# Patient Record
Sex: FEMALE | Race: WHITE | HISPANIC OR LATINO | URBAN - METROPOLITAN AREA
[De-identification: names, ages, dates, MRNs, and addresses within clinical notes are randomized per-mention and may not be internally consistent; named-entity substitution may affect disease eponyms.]

---

## 2017-01-06 RX ORDER — TRAZODONE HCL 50 MG
0 TABLET ORAL
Qty: 30 | Refills: 0 | COMMUNITY
Start: 2017-01-06

## 2017-01-06 RX ORDER — TRAZODONE HCL 50 MG
1 TABLET ORAL
Qty: 30 | Refills: 0 | COMMUNITY
Start: 2017-01-06

## 2017-01-31 RX ORDER — LOSARTAN POTASSIUM 100 MG/1
0 TABLET, FILM COATED ORAL
Qty: 60 | Refills: 0 | COMMUNITY
Start: 2017-01-31

## 2017-01-31 RX ORDER — LOSARTAN POTASSIUM 100 MG/1
1 TABLET, FILM COATED ORAL
Qty: 60 | Refills: 0 | COMMUNITY
Start: 2017-01-31

## 2017-02-01 RX ORDER — INSULIN GLARGINE 100 [IU]/ML
0 INJECTION, SOLUTION SUBCUTANEOUS
Qty: 10 | Refills: 0 | COMMUNITY
Start: 2017-02-01

## 2017-02-02 RX ORDER — TEMAZEPAM 15 MG/1
0 CAPSULE ORAL
Qty: 30 | Refills: 0 | COMMUNITY
Start: 2017-02-02

## 2017-02-02 RX ORDER — TEMAZEPAM 15 MG/1
1 CAPSULE ORAL
Qty: 30 | Refills: 0 | COMMUNITY
Start: 2017-02-02

## 2017-02-20 RX ORDER — METOCLOPRAMIDE HCL 10 MG
1 TABLET ORAL
Qty: 90 | Refills: 0 | COMMUNITY
Start: 2017-02-20

## 2017-02-20 RX ORDER — CALCIUM ACETATE 667 MG
0 TABLET ORAL
Qty: 180 | Refills: 0 | COMMUNITY
Start: 2017-02-20

## 2017-02-20 RX ORDER — METOCLOPRAMIDE HCL 10 MG
0 TABLET ORAL
Qty: 90 | Refills: 0 | COMMUNITY
Start: 2017-02-20

## 2017-02-20 RX ORDER — CALCIUM ACETATE 667 MG
2 TABLET ORAL
Qty: 180 | Refills: 0 | COMMUNITY
Start: 2017-02-20

## 2017-02-20 RX ORDER — FUROSEMIDE 40 MG
0 TABLET ORAL
Qty: 30 | Refills: 0 | COMMUNITY
Start: 2017-02-20

## 2017-03-06 RX ORDER — CLOPIDOGREL BISULFATE 75 MG/1
1 TABLET, FILM COATED ORAL
Qty: 30 | Refills: 0 | COMMUNITY
Start: 2017-03-06

## 2017-03-06 RX ORDER — CLOPIDOGREL BISULFATE 75 MG/1
0 TABLET, FILM COATED ORAL
Qty: 30 | Refills: 0 | COMMUNITY
Start: 2017-03-06

## 2017-03-15 RX ORDER — SIMVASTATIN 20 MG/1
0 TABLET, FILM COATED ORAL
Qty: 30 | Refills: 0 | COMMUNITY
Start: 2017-03-15

## 2017-03-15 RX ORDER — CARVEDILOL PHOSPHATE 80 MG/1
0 CAPSULE, EXTENDED RELEASE ORAL
Qty: 60 | Refills: 0 | COMMUNITY
Start: 2017-03-15

## 2017-03-15 RX ORDER — SIMVASTATIN 20 MG/1
1 TABLET, FILM COATED ORAL
Qty: 30 | Refills: 0 | COMMUNITY
Start: 2017-03-15

## 2017-03-15 RX ORDER — CARVEDILOL PHOSPHATE 80 MG/1
1 CAPSULE, EXTENDED RELEASE ORAL
Qty: 60 | Refills: 0 | COMMUNITY
Start: 2017-03-15

## 2017-03-16 ENCOUNTER — INPATIENT (INPATIENT)
Facility: HOSPITAL | Age: 72
LOS: 7 days | Discharge: HOME | DRG: 291 | End: 2017-03-24
Attending: INTERNAL MEDICINE | Admitting: INTERNAL MEDICINE
Payer: MEDICARE

## 2017-03-16 VITALS
TEMPERATURE: 99 F | DIASTOLIC BLOOD PRESSURE: 68 MMHG | SYSTOLIC BLOOD PRESSURE: 137 MMHG | HEART RATE: 87 BPM | OXYGEN SATURATION: 95 % | RESPIRATION RATE: 18 BRPM

## 2017-03-16 DIAGNOSIS — J81.0 ACUTE PULMONARY EDEMA: ICD-10-CM

## 2017-03-16 LAB
ALBUMIN SERPL ELPH-MCNC: 3.8 G/DL — SIGNIFICANT CHANGE UP (ref 3.3–5)
ALP SERPL-CCNC: 86 U/L — SIGNIFICANT CHANGE UP (ref 40–120)
ALT FLD-CCNC: 19 U/L RC — SIGNIFICANT CHANGE UP (ref 10–45)
ANION GAP SERPL CALC-SCNC: 16 MMOL/L — SIGNIFICANT CHANGE UP (ref 5–17)
APTT BLD: 32.7 SEC — SIGNIFICANT CHANGE UP (ref 27.5–37.4)
AST SERPL-CCNC: 28 U/L — SIGNIFICANT CHANGE UP (ref 10–40)
BASE EXCESS BLDV CALC-SCNC: 3.1 MMOL/L — HIGH (ref -2–2)
BASOPHILS # BLD AUTO: 0 K/UL — SIGNIFICANT CHANGE UP (ref 0–0.2)
BASOPHILS NFR BLD AUTO: 0 % — SIGNIFICANT CHANGE UP (ref 0–2)
BILIRUB SERPL-MCNC: 0.4 MG/DL — SIGNIFICANT CHANGE UP (ref 0.2–1.2)
BUN SERPL-MCNC: 27 MG/DL — HIGH (ref 7–23)
CA-I SERPL-SCNC: 1.15 MMOL/L — SIGNIFICANT CHANGE UP (ref 1.12–1.3)
CALCIUM SERPL-MCNC: 8.9 MG/DL — SIGNIFICANT CHANGE UP (ref 8.4–10.5)
CHLORIDE BLDV-SCNC: 103 MMOL/L — SIGNIFICANT CHANGE UP (ref 96–108)
CHLORIDE SERPL-SCNC: 100 MMOL/L — SIGNIFICANT CHANGE UP (ref 96–108)
CO2 BLDV-SCNC: 30 MMOL/L — SIGNIFICANT CHANGE UP (ref 22–30)
CO2 SERPL-SCNC: 24 MMOL/L — SIGNIFICANT CHANGE UP (ref 22–31)
CREAT SERPL-MCNC: 4.05 MG/DL — HIGH (ref 0.5–1.3)
EOSINOPHIL # BLD AUTO: 0 K/UL — SIGNIFICANT CHANGE UP (ref 0–0.5)
EOSINOPHIL NFR BLD AUTO: 0.1 % — SIGNIFICANT CHANGE UP (ref 0–6)
GAS PNL BLDV: 136 MMOL/L — SIGNIFICANT CHANGE UP (ref 136–145)
GAS PNL BLDV: SIGNIFICANT CHANGE UP
GLUCOSE BLDV-MCNC: 306 MG/DL — HIGH (ref 70–99)
GLUCOSE SERPL-MCNC: 344 MG/DL — HIGH (ref 70–99)
HCO3 BLDV-SCNC: 29 MMOL/L — SIGNIFICANT CHANGE UP (ref 21–29)
HCOV OC43 RNA SPEC QL NAA+PROBE: DETECTED
HCT VFR BLD CALC: 34.2 % — LOW (ref 39–50)
HCT VFR BLDA CALC: 32 % — LOW (ref 39–50)
HGB BLD CALC-MCNC: 10.4 G/DL — LOW (ref 13–17)
HGB BLD-MCNC: 10.8 G/DL — LOW (ref 13–17)
INR BLD: 1.15 RATIO — SIGNIFICANT CHANGE UP (ref 0.88–1.16)
LACTATE BLDV-MCNC: 1.3 MMOL/L — SIGNIFICANT CHANGE UP (ref 0.7–2)
LYMPHOCYTES # BLD AUTO: 0.4 K/UL — LOW (ref 1–3.3)
LYMPHOCYTES # BLD AUTO: 7.6 % — LOW (ref 13–44)
MCHC RBC-ENTMCNC: 31.7 GM/DL — LOW (ref 32–36)
MCHC RBC-ENTMCNC: 32.6 PG — SIGNIFICANT CHANGE UP (ref 27–34)
MCV RBC AUTO: 103 FL — HIGH (ref 80–100)
MONOCYTES # BLD AUTO: 0.6 K/UL — SIGNIFICANT CHANGE UP (ref 0–0.9)
MONOCYTES NFR BLD AUTO: 9.9 % — SIGNIFICANT CHANGE UP (ref 2–14)
NEUTROPHILS # BLD AUTO: 4.8 K/UL — SIGNIFICANT CHANGE UP (ref 1.8–7.4)
NEUTROPHILS NFR BLD AUTO: 82.4 % — HIGH (ref 43–77)
OTHER CELLS CSF MANUAL: 11 ML/DL — LOW (ref 18–22)
PCO2 BLDV: 53 MMHG — HIGH (ref 35–50)
PH BLDV: 7.36 — SIGNIFICANT CHANGE UP (ref 7.35–7.45)
PLATELET # BLD AUTO: 147 K/UL — LOW (ref 150–400)
PO2 BLDV: 46 MMHG — HIGH (ref 25–45)
POTASSIUM BLDV-SCNC: 4.2 MMOL/L — SIGNIFICANT CHANGE UP (ref 3.5–5)
POTASSIUM SERPL-MCNC: 4.5 MMOL/L — SIGNIFICANT CHANGE UP (ref 3.5–5.3)
POTASSIUM SERPL-SCNC: 4.5 MMOL/L — SIGNIFICANT CHANGE UP (ref 3.5–5.3)
PROT SERPL-MCNC: 7.2 G/DL — SIGNIFICANT CHANGE UP (ref 6–8.3)
PROTHROM AB SERPL-ACNC: 12.6 SEC — SIGNIFICANT CHANGE UP (ref 10–13.1)
RAPID RVP RESULT: DETECTED
RBC # BLD: 3.32 M/UL — LOW (ref 4.2–5.8)
RBC # FLD: 19.3 % — HIGH (ref 10.3–14.5)
SAO2 % BLDV: 75 % — SIGNIFICANT CHANGE UP (ref 67–88)
SODIUM SERPL-SCNC: 140 MMOL/L — SIGNIFICANT CHANGE UP (ref 135–145)
TROPONIN T SERPL-MCNC: 0.12 NG/ML — HIGH (ref 0–0.06)
WBC # BLD: 5.8 K/UL — SIGNIFICANT CHANGE UP (ref 3.8–10.5)
WBC # FLD AUTO: 5.8 K/UL — SIGNIFICANT CHANGE UP (ref 3.8–10.5)

## 2017-03-16 PROCEDURE — 99285 EMERGENCY DEPT VISIT HI MDM: CPT | Mod: 25

## 2017-03-16 PROCEDURE — 71010: CPT | Mod: 26

## 2017-03-16 PROCEDURE — 93010 ELECTROCARDIOGRAM REPORT: CPT

## 2017-03-16 RX ORDER — SODIUM CHLORIDE 9 MG/ML
3 INJECTION INTRAMUSCULAR; INTRAVENOUS; SUBCUTANEOUS ONCE
Qty: 0 | Refills: 0 | Status: COMPLETED | OUTPATIENT
Start: 2017-03-16 | End: 2017-03-16

## 2017-03-16 RX ORDER — ALBUTEROL 90 UG/1
2.5 AEROSOL, METERED ORAL ONCE
Qty: 0 | Refills: 0 | Status: COMPLETED | OUTPATIENT
Start: 2017-03-16 | End: 2017-03-16

## 2017-03-16 RX ORDER — ALBUTEROL 90 UG/1
2.5 AEROSOL, METERED ORAL ONCE
Qty: 0 | Refills: 0 | Status: DISCONTINUED | OUTPATIENT
Start: 2017-03-16 | End: 2017-03-16

## 2017-03-16 RX ADMIN — ALBUTEROL 2.5 MILLIGRAM(S): 90 AEROSOL, METERED ORAL at 20:00

## 2017-03-16 RX ADMIN — SODIUM CHLORIDE 3 MILLILITER(S): 9 INJECTION INTRAMUSCULAR; INTRAVENOUS; SUBCUTANEOUS at 20:08

## 2017-03-16 NOTE — ED PROVIDER NOTE - NS ED MD SCRIBE ATTENDING SCRIBE SECTIONS
PAST MEDICAL/SURGICAL/SOCIAL HISTORY/HISTORY OF PRESENT ILLNESS/INTAKE ASSESSMENT/SCREENINGS/HIV/VITAL SIGNS( Pullset)/REVIEW OF SYSTEMS/PHYSICAL EXAM

## 2017-03-16 NOTE — ED ADULT NURSE NOTE - OBJECTIVE STATEMENT
71 year old female patient presents to ED c/o cough and difficulty breathing x 3 weeks. Daughter at bedside reports worsening SOB, and difficulty laying flat - she has only been able to sleep in a chair. Pt receives dialysis MWF with fistula present to RUE. Pt appears SOB and placed on 3L NC. Pt denies chest pain, n/v/d, fever, chills.

## 2017-03-16 NOTE — ED PROVIDER NOTE - PROGRESS NOTE DETAILS
Symptoms improved but not resolved after albuterol treatment. Potassium noted to be 4.5 Symptoms improved after albuterol treatment. Potassium noted to be 4.5 --BMM Nephrology fellow consulted; will see pt in ED.  --BMM

## 2017-03-16 NOTE — ED PROVIDER NOTE - MUSCULOSKELETAL, MLM
Spine appears normal, range of motion is not limited, no muscle or joint tenderness, no pitting edema distally

## 2017-03-16 NOTE — ED PROVIDER NOTE - CARE PLAN
Principal Discharge DX:	Acute pulmonary edema  Secondary Diagnosis:	Hypoxia  Secondary Diagnosis:	Pneumonia due to infectious organism, unspecified laterality, unspecified part of lung

## 2017-03-16 NOTE — ED PROVIDER NOTE - MEDICAL DECISION MAKING DETAILS
71 year old chronically ill female, hx of ESRD dialyzed yesterday while in patient for treatment for hyperkalemia at [place], presents with worsening difficulty breathing, SOB, orthopnea. Has progressively worsening MURPHY. Pulmonary exam concerning for pulmonary edema vs pleural effusion. Less likely PNA though will check RVP. Will check labs, CXR, EKG, and give Albuterol, supplemental oxygen, low threshold to start BIPAP, pt likely needs dialysis and admission.

## 2017-03-16 NOTE — ED PROVIDER NOTE - OBJECTIVE STATEMENT
71 year old female with PMHx of HTN, anxiety, depression, DM, CKD s/p infection from colonoscopy, on dialysis at [place], presents with cough productive of white sputum and dyspnea, and worsening orthopnea x several weeks. States she sleeps sitting upright. Denies fevers, chills, N/V, CP, or pedal edema. Endorses recent sensation of pressure in upper abdomen. Pt with multiple hospitalizations at [facility] for hyperkalemia- last admitted 1 week ago and had normal dialysis there yesterday. Pt's family reports that she had the cough while admitted but was not treated for it, only had CXR of which they do not know the results. No recent antibiotics. Pt has not seen her PMD recently due to multiple hospitalizations.   PMD: Dr. Og ([place])   Medications: Lasix, Reglan, Diazepam, Losartan, Lopressor, Simvastatin, Calcium, Carvedilol, Lantis, Trazodone. Reports compliance with her medications. 71 year old female with PMHx of HTN, anxiety, depression, DM, ESRD s/p infection from colonoscopy, on dialysis at [place], presents with cough productive of white sputum and dyspnea, and worsening orthopnea x several weeks. States she sleeps sitting upright. Denies fevers, chills, N/V, CP, or pedal edema. Endorses recent sensation of pressure in upper abdomen. Pt with multiple hospitalizations at [facility] for hyperkalemia- last admitted 1 week ago and had normal dialysis there yesterday. Pt's family reports that she had the cough while admitted but was not treated for it, only had CXR of which they do not know the results. No recent antibiotics. Pt has not seen her PMD recently due to multiple hospitalizations.   PMD: Dr. Og ([place])   Medications: Lasix, Reglan, Diazepam, Losartan, Lopressor, Simvastatin, Calcium, Carvedilol, Lantis, Trazodone. Reports compliance with her medications.

## 2017-03-17 DIAGNOSIS — R06.00 DYSPNEA, UNSPECIFIED: ICD-10-CM

## 2017-03-17 DIAGNOSIS — E11.21 TYPE 2 DIABETES MELLITUS WITH DIABETIC NEPHROPATHY: ICD-10-CM

## 2017-03-17 DIAGNOSIS — J81.0 ACUTE PULMONARY EDEMA: ICD-10-CM

## 2017-03-17 DIAGNOSIS — I10 ESSENTIAL (PRIMARY) HYPERTENSION: ICD-10-CM

## 2017-03-17 DIAGNOSIS — N18.6 END STAGE RENAL DISEASE: ICD-10-CM

## 2017-03-17 DIAGNOSIS — F41.9 ANXIETY DISORDER, UNSPECIFIED: ICD-10-CM

## 2017-03-17 LAB
ANION GAP SERPL CALC-SCNC: 18 MMOL/L — HIGH (ref 5–17)
BASOPHILS # BLD AUTO: 0 K/UL — SIGNIFICANT CHANGE UP (ref 0–0.2)
BASOPHILS NFR BLD AUTO: 0 % — SIGNIFICANT CHANGE UP (ref 0–2)
BUN SERPL-MCNC: 31 MG/DL — HIGH (ref 7–23)
CALCIUM SERPL-MCNC: 8.9 MG/DL — SIGNIFICANT CHANGE UP (ref 8.4–10.5)
CHLORIDE SERPL-SCNC: 96 MMOL/L — SIGNIFICANT CHANGE UP (ref 96–108)
CK MB CFR SERPL CALC: 3.1 NG/ML — SIGNIFICANT CHANGE UP (ref 0–3.8)
CK SERPL-CCNC: 48 U/L — SIGNIFICANT CHANGE UP (ref 25–170)
CO2 SERPL-SCNC: 25 MMOL/L — SIGNIFICANT CHANGE UP (ref 22–31)
CREAT SERPL-MCNC: 4.38 MG/DL — HIGH (ref 0.5–1.3)
EOSINOPHIL # BLD AUTO: 0 K/UL — SIGNIFICANT CHANGE UP (ref 0–0.5)
EOSINOPHIL NFR BLD AUTO: 0 % — SIGNIFICANT CHANGE UP (ref 0–6)
GAS PNL BLDA: SIGNIFICANT CHANGE UP
GLUCOSE SERPL-MCNC: 284 MG/DL — HIGH (ref 70–99)
HCT VFR BLD CALC: 35.2 % — SIGNIFICANT CHANGE UP (ref 34.5–45)
HGB BLD-MCNC: 11.2 G/DL — LOW (ref 11.5–15.5)
LYMPHOCYTES # BLD AUTO: 0.3 K/UL — LOW (ref 1–3.3)
LYMPHOCYTES # BLD AUTO: 2.3 % — LOW (ref 13–44)
MAGNESIUM SERPL-MCNC: 2 MG/DL — SIGNIFICANT CHANGE UP (ref 1.6–2.6)
MCHC RBC-ENTMCNC: 31.9 GM/DL — LOW (ref 32–36)
MCHC RBC-ENTMCNC: 33 PG — SIGNIFICANT CHANGE UP (ref 27–34)
MCV RBC AUTO: 103 FL — HIGH (ref 80–100)
MONOCYTES # BLD AUTO: 0.5 K/UL — SIGNIFICANT CHANGE UP (ref 0–0.9)
MONOCYTES NFR BLD AUTO: 3.8 % — SIGNIFICANT CHANGE UP (ref 2–14)
NEUTROPHILS # BLD AUTO: 12.7 K/UL — HIGH (ref 1.8–7.4)
NEUTROPHILS NFR BLD AUTO: 93.9 % — HIGH (ref 43–77)
PHOSPHATE SERPL-MCNC: 3.6 MG/DL — SIGNIFICANT CHANGE UP (ref 2.5–4.5)
PLATELET # BLD AUTO: 141 K/UL — LOW (ref 150–400)
POTASSIUM SERPL-MCNC: 4.5 MMOL/L — SIGNIFICANT CHANGE UP (ref 3.5–5.3)
POTASSIUM SERPL-SCNC: 4.5 MMOL/L — SIGNIFICANT CHANGE UP (ref 3.5–5.3)
RBC # BLD: 3.41 M/UL — LOW (ref 3.8–5.2)
RBC # FLD: 19.1 % — HIGH (ref 10.3–14.5)
SODIUM SERPL-SCNC: 139 MMOL/L — SIGNIFICANT CHANGE UP (ref 135–145)
TROPONIN T SERPL-MCNC: 0.11 NG/ML — HIGH (ref 0–0.06)
WBC # BLD: 13.5 K/UL — HIGH (ref 3.8–10.5)
WBC # FLD AUTO: 13.5 K/UL — HIGH (ref 3.8–10.5)

## 2017-03-17 PROCEDURE — 99223 1ST HOSP IP/OBS HIGH 75: CPT

## 2017-03-17 PROCEDURE — 93306 TTE W/DOPPLER COMPLETE: CPT | Mod: 26

## 2017-03-17 PROCEDURE — 71250 CT THORAX DX C-: CPT | Mod: 26

## 2017-03-17 PROCEDURE — 70450 CT HEAD/BRAIN W/O DYE: CPT | Mod: 26

## 2017-03-17 PROCEDURE — 99222 1ST HOSP IP/OBS MODERATE 55: CPT

## 2017-03-17 RX ORDER — CALCIUM ACETATE 667 MG
1334 TABLET ORAL
Qty: 0 | Refills: 0 | Status: DISCONTINUED | OUTPATIENT
Start: 2017-03-17 | End: 2017-03-24

## 2017-03-17 RX ORDER — CARVEDILOL PHOSPHATE 80 MG/1
12.5 CAPSULE, EXTENDED RELEASE ORAL EVERY 12 HOURS
Qty: 0 | Refills: 0 | Status: DISCONTINUED | OUTPATIENT
Start: 2017-03-17 | End: 2017-03-24

## 2017-03-17 RX ORDER — MORPHINE SULFATE 50 MG/1
4 CAPSULE, EXTENDED RELEASE ORAL ONCE
Qty: 0 | Refills: 0 | Status: DISCONTINUED | OUTPATIENT
Start: 2017-03-17 | End: 2017-03-17

## 2017-03-17 RX ORDER — TEMAZEPAM 15 MG/1
15 CAPSULE ORAL ONCE
Qty: 0 | Refills: 0 | Status: DISCONTINUED | OUTPATIENT
Start: 2017-03-17 | End: 2017-03-17

## 2017-03-17 RX ORDER — INFLUENZA VIRUS VACCINE 15; 15; 15; 15 UG/.5ML; UG/.5ML; UG/.5ML; UG/.5ML
0.5 SUSPENSION INTRAMUSCULAR ONCE
Qty: 0 | Refills: 0 | Status: COMPLETED | OUTPATIENT
Start: 2017-03-17 | End: 2017-03-17

## 2017-03-17 RX ORDER — INSULIN LISPRO 100/ML
VIAL (ML) SUBCUTANEOUS
Qty: 0 | Refills: 0 | Status: DISCONTINUED | OUTPATIENT
Start: 2017-03-17 | End: 2017-03-24

## 2017-03-17 RX ORDER — INSULIN LISPRO 100/ML
6 VIAL (ML) SUBCUTANEOUS ONCE
Qty: 0 | Refills: 0 | Status: COMPLETED | OUTPATIENT
Start: 2017-03-17 | End: 2017-03-17

## 2017-03-17 RX ORDER — TRAZODONE HCL 50 MG
100 TABLET ORAL AT BEDTIME
Qty: 0 | Refills: 0 | Status: DISCONTINUED | OUTPATIENT
Start: 2017-03-17 | End: 2017-03-24

## 2017-03-17 RX ORDER — LOSARTAN POTASSIUM 100 MG/1
25 TABLET, FILM COATED ORAL DAILY
Qty: 0 | Refills: 0 | Status: DISCONTINUED | OUTPATIENT
Start: 2017-03-17 | End: 2017-03-24

## 2017-03-17 RX ORDER — ASPIRIN/CALCIUM CARB/MAGNESIUM 325 MG
325 TABLET ORAL ONCE
Qty: 0 | Refills: 0 | Status: COMPLETED | OUTPATIENT
Start: 2017-03-17 | End: 2017-03-17

## 2017-03-17 RX ORDER — CLOPIDOGREL BISULFATE 75 MG/1
75 TABLET, FILM COATED ORAL DAILY
Qty: 0 | Refills: 0 | Status: DISCONTINUED | OUTPATIENT
Start: 2017-03-17 | End: 2017-03-24

## 2017-03-17 RX ORDER — INSULIN LISPRO 100/ML
VIAL (ML) SUBCUTANEOUS AT BEDTIME
Qty: 0 | Refills: 0 | Status: DISCONTINUED | OUTPATIENT
Start: 2017-03-17 | End: 2017-03-24

## 2017-03-17 RX ORDER — HUMAN INSULIN 100 [IU]/ML
10 INJECTION, SUSPENSION SUBCUTANEOUS DAILY
Qty: 0 | Refills: 0 | Status: DISCONTINUED | OUTPATIENT
Start: 2017-03-17 | End: 2017-03-21

## 2017-03-17 RX ORDER — HEPARIN SODIUM 5000 [USP'U]/ML
5000 INJECTION INTRAVENOUS; SUBCUTANEOUS EVERY 12 HOURS
Qty: 0 | Refills: 0 | Status: DISCONTINUED | OUTPATIENT
Start: 2017-03-17 | End: 2017-03-24

## 2017-03-17 RX ORDER — FUROSEMIDE 40 MG
80 TABLET ORAL DAILY
Qty: 0 | Refills: 0 | Status: DISCONTINUED | OUTPATIENT
Start: 2017-03-17 | End: 2017-03-17

## 2017-03-17 RX ORDER — SIMVASTATIN 20 MG/1
40 TABLET, FILM COATED ORAL AT BEDTIME
Qty: 0 | Refills: 0 | Status: DISCONTINUED | OUTPATIENT
Start: 2017-03-17 | End: 2017-03-24

## 2017-03-17 RX ORDER — METOCLOPRAMIDE HCL 10 MG
10 TABLET ORAL EVERY 8 HOURS
Qty: 0 | Refills: 0 | Status: DISCONTINUED | OUTPATIENT
Start: 2017-03-17 | End: 2017-03-24

## 2017-03-17 RX ORDER — FUROSEMIDE 40 MG
80 TABLET ORAL ONCE
Qty: 0 | Refills: 0 | Status: COMPLETED | OUTPATIENT
Start: 2017-03-17 | End: 2017-03-17

## 2017-03-17 RX ORDER — TEMAZEPAM 15 MG/1
15 CAPSULE ORAL AT BEDTIME
Qty: 0 | Refills: 0 | Status: DISCONTINUED | OUTPATIENT
Start: 2017-03-17 | End: 2017-03-19

## 2017-03-17 RX ADMIN — Medication 6 UNIT(S): at 00:43

## 2017-03-17 RX ADMIN — Medication 3: at 08:26

## 2017-03-17 RX ADMIN — HEPARIN SODIUM 5000 UNIT(S): 5000 INJECTION INTRAVENOUS; SUBCUTANEOUS at 06:44

## 2017-03-17 RX ADMIN — Medication 325 MILLIGRAM(S): at 06:39

## 2017-03-17 RX ADMIN — Medication 80 MILLIGRAM(S): at 03:10

## 2017-03-17 RX ADMIN — CARVEDILOL PHOSPHATE 12.5 MILLIGRAM(S): 80 CAPSULE, EXTENDED RELEASE ORAL at 22:28

## 2017-03-17 RX ADMIN — Medication 1 MILLIGRAM(S): at 03:35

## 2017-03-17 RX ADMIN — MORPHINE SULFATE 4 MILLIGRAM(S): 50 CAPSULE, EXTENDED RELEASE ORAL at 03:30

## 2017-03-17 RX ADMIN — MORPHINE SULFATE 4 MILLIGRAM(S): 50 CAPSULE, EXTENDED RELEASE ORAL at 03:04

## 2017-03-17 RX ADMIN — CLOPIDOGREL BISULFATE 75 MILLIGRAM(S): 75 TABLET, FILM COATED ORAL at 22:28

## 2017-03-17 RX ADMIN — Medication 75 MILLIGRAM(S): at 06:39

## 2017-03-17 RX ADMIN — SIMVASTATIN 40 MILLIGRAM(S): 20 TABLET, FILM COATED ORAL at 22:28

## 2017-03-17 RX ADMIN — TEMAZEPAM 15 MILLIGRAM(S): 15 CAPSULE ORAL at 06:40

## 2017-03-17 NOTE — H&P ADULT. - PROBLEM SELECTOR PLAN 1
Pt here with acute pulmonary edema, likely 2/2 volume overload.  Still produces urine and is on high dose diuretics that were recently changed to torsemide.  Will diurese with IV lasix for now to improve pulmonary edema.   Will need HD for further volume removal.  TTE to evaluate cardiac function  Trend cardiac enzymes given chest pain and shortness of breath as this is also concerning for possibility of unstable angina.  Cardiology/CHF consult this AM.

## 2017-03-17 NOTE — H&P ADULT. - EKG AND INTERPRETATION
EKG personally reviewed, NSR @ 64bpm.  T-wave flattening in AvF possible inverted p wave in AvF and III

## 2017-03-17 NOTE — H&P ADULT. - PROBLEM SELECTOR PLAN 2
Pt with respiratory distress 2/2 acute pulmonary edema.  Attempting to diurese fluid, will need hemodialysis to further improve volume status.  May need BiPAP if patient does not respond to diuresis.

## 2017-03-17 NOTE — H&P ADULT. - PROBLEM SELECTOR PLAN 6
Pt with severe anxiety.  At baseline is on very high doses of temazepam for a patient her age.  Responded well to 1mg of ativan IV with improvement in anxiety.    Can have temazepam PRN at night-with reduction of dose from 30mg to 15mg but this should be further titrated down.  May benefit from SSRI but would like to obtain further collateral before starting.

## 2017-03-17 NOTE — H&P ADULT. - PMH
Depression, unspecified depression type    Diabetes    ESRD (end stage renal disease) on dialysis    Essential hypertension

## 2017-03-17 NOTE — H&P ADULT. - PROBLEM SELECTOR PLAN 4
Patient reports being only on 2 units of lantus qhs which does not seem likely?  Will need to clarify-patient reports that she injects herself without the help of family.  Place on ISS for now and monitor finger sticks, is s/p 6 units of humalog in ED.  Will likely need standing insulin but unclear why she appears to have been switched from humalog to Lantus after recent admission at OSH, given her ESRD do feel that NPH may be the better agent for patient. Patient reports being only on 2 units of lantus qhs which does not seem likely?  Will need to clarify-patient reports that she injects herself without the help of family.  Place on ISS for now and monitor finger sticks, is s/p 6 units of humalog in ED.  Will need standing insulin given hyperglycemia-patient appears to have recently been switched from humalog to Lantus after recent admission at OSH but given her ESRD do feel that NPH may be the better agent for patient.  Will start on NPH 10 units daily for now.

## 2017-03-17 NOTE — H&P ADULT. - GASTROINTESTINAL COMMENTS
Pt denies nausea though does admit she was previously prescribed reglan for nausea-however denies taking it?  Patient currently in distress w/anxiety and pain and refuses to further explain why she was prescribed reglan.

## 2017-03-17 NOTE — H&P ADULT. - LAB RESULTS AND INTERPRETATION
Labwork reviewed, RVP positive for coronavirus, mild anemia and thrombocytpenia.  Elevated Troponin T to 0.12 in setting of Creatinine 4.05.  Elevated serum glucose to 344.

## 2017-03-17 NOTE — H&P ADULT. - ASSESSMENT
71 year old female with PMHx of HTN, anxiety, depression, DM2, ESRD s/p infection from colonoscopy, on dialysis at [place]-last yesterday, who now presents with respiratory distress with pulmonary edema and b/l pleural effusions as well as chest pain also noted to have coronavirus.

## 2017-03-17 NOTE — H&P ADULT. - HISTORY OF PRESENT ILLNESS
71 year old female with PMHx of HTN, anxiety, depression, DM2, ESRD s/p infection from colonoscopy, on dialysis at [place]-last yesterday, who now presents with worsening orthopnea and shortness of breath x 2 weeks. 71 year old female with PMHx of HTN, anxiety, depression, DM2, ESRD s/p infection from colonoscopy, on dialysis at [place]-last yesterday, who now presents with worsening orthopnea and shortness of breath x 2 weeks associated with a pressure like chest pain.  Patient reports that she has had URI like symptoms x 2 weeks including a cough productive of whitish sputum as well as sneezing for 2 weeks.  +chills intermittently but no subjective or measured fever.  However, patient has also had dyspnea that seems to be worst when attempting to lay down-patient is very uncomfortable and feels an increased chest pressure when she attempts to lay down.  This is improved with sitting up as well as standing and patient has not been able to sleep lying down.  Patient reports that over the last day this chest pressure/pain has significantly worsened and is currently a 7/10 pressure like pain.  No nausea/vomiting, no diarrhea.  No abdominal pain.  Patient does report some generalized muscle aches x 2 weeks.  Patient did have very recent admission     In the [facility] ED patient received Levofloxacin 500mg iv x1, Humalog 6 units sq x 1

## 2017-03-18 LAB
ALBUMIN SERPL ELPH-MCNC: 3 G/DL — LOW (ref 3.3–5)
ALP SERPL-CCNC: 87 U/L — SIGNIFICANT CHANGE UP (ref 40–120)
ALT FLD-CCNC: 22 U/L — SIGNIFICANT CHANGE UP (ref 10–45)
AMMONIA BLD-MCNC: 24 UMOL/L — SIGNIFICANT CHANGE UP (ref 11–55)
ANION GAP SERPL CALC-SCNC: 14 MMOL/L — SIGNIFICANT CHANGE UP (ref 5–17)
AST SERPL-CCNC: 38 U/L — SIGNIFICANT CHANGE UP (ref 10–40)
BILIRUB DIRECT SERPL-MCNC: 0.2 MG/DL — SIGNIFICANT CHANGE UP (ref 0–0.2)
BILIRUB INDIRECT FLD-MCNC: 0.4 MG/DL — SIGNIFICANT CHANGE UP (ref 0.2–1)
BILIRUB SERPL-MCNC: 0.6 MG/DL — SIGNIFICANT CHANGE UP (ref 0.2–1.2)
BUN SERPL-MCNC: 20 MG/DL — SIGNIFICANT CHANGE UP (ref 7–23)
CALCIUM SERPL-MCNC: 8.7 MG/DL — SIGNIFICANT CHANGE UP (ref 8.4–10.5)
CHLORIDE SERPL-SCNC: 96 MMOL/L — SIGNIFICANT CHANGE UP (ref 96–108)
CK MB BLD-MCNC: 6.3 % — HIGH (ref 0–3.5)
CK MB CFR SERPL CALC: 1.9 NG/ML — SIGNIFICANT CHANGE UP (ref 0–3.8)
CK SERPL-CCNC: 30 U/L — SIGNIFICANT CHANGE UP (ref 25–170)
CO2 SERPL-SCNC: 24 MMOL/L — SIGNIFICANT CHANGE UP (ref 22–31)
CREAT SERPL-MCNC: 3.06 MG/DL — HIGH (ref 0.5–1.3)
GLUCOSE SERPL-MCNC: 204 MG/DL — HIGH (ref 70–99)
HCT VFR BLD CALC: 32.2 % — LOW (ref 34.5–45)
HGB BLD-MCNC: 9.7 G/DL — LOW (ref 11.5–15.5)
MAGNESIUM SERPL-MCNC: 2 MG/DL — SIGNIFICANT CHANGE UP (ref 1.6–2.6)
MCHC RBC-ENTMCNC: 30.1 GM/DL — LOW (ref 32–36)
MCHC RBC-ENTMCNC: 31.1 PG — SIGNIFICANT CHANGE UP (ref 27–34)
MCV RBC AUTO: 103.2 FL — HIGH (ref 80–100)
PHOSPHATE SERPL-MCNC: 3.5 MG/DL — SIGNIFICANT CHANGE UP (ref 2.5–4.5)
PLATELET # BLD AUTO: 149 K/UL — LOW (ref 150–400)
POTASSIUM SERPL-MCNC: 4.4 MMOL/L — SIGNIFICANT CHANGE UP (ref 3.5–5.3)
POTASSIUM SERPL-SCNC: 4.4 MMOL/L — SIGNIFICANT CHANGE UP (ref 3.5–5.3)
PROT SERPL-MCNC: 6.7 G/DL — SIGNIFICANT CHANGE UP (ref 6–8.3)
RBC # BLD: 3.12 M/UL — LOW (ref 3.8–5.2)
RBC # FLD: 20.3 % — HIGH (ref 10.3–14.5)
SODIUM SERPL-SCNC: 134 MMOL/L — LOW (ref 135–145)
TROPONIN T SERPL-MCNC: 0.12 NG/ML — HIGH (ref 0–0.06)
WBC # BLD: 10.67 K/UL — HIGH (ref 3.8–10.5)
WBC # FLD AUTO: 10.67 K/UL — HIGH (ref 3.8–10.5)

## 2017-03-18 PROCEDURE — 99223 1ST HOSP IP/OBS HIGH 75: CPT | Mod: GC

## 2017-03-18 PROCEDURE — 99233 SBSQ HOSP IP/OBS HIGH 50: CPT

## 2017-03-18 RX ORDER — IPRATROPIUM/ALBUTEROL SULFATE 18-103MCG
3 AEROSOL WITH ADAPTER (GRAM) INHALATION EVERY 6 HOURS
Qty: 0 | Refills: 0 | Status: DISCONTINUED | OUTPATIENT
Start: 2017-03-18 | End: 2017-03-24

## 2017-03-18 RX ADMIN — HEPARIN SODIUM 5000 UNIT(S): 5000 INJECTION INTRAVENOUS; SUBCUTANEOUS at 17:25

## 2017-03-18 RX ADMIN — Medication 1334 MILLIGRAM(S): at 12:59

## 2017-03-18 RX ADMIN — CLOPIDOGREL BISULFATE 75 MILLIGRAM(S): 75 TABLET, FILM COATED ORAL at 12:55

## 2017-03-18 RX ADMIN — Medication 1: at 12:55

## 2017-03-18 RX ADMIN — SIMVASTATIN 40 MILLIGRAM(S): 20 TABLET, FILM COATED ORAL at 21:43

## 2017-03-18 RX ADMIN — CARVEDILOL PHOSPHATE 12.5 MILLIGRAM(S): 80 CAPSULE, EXTENDED RELEASE ORAL at 05:20

## 2017-03-18 RX ADMIN — Medication 1334 MILLIGRAM(S): at 17:24

## 2017-03-18 RX ADMIN — HUMAN INSULIN 10 UNIT(S): 100 INJECTION, SUSPENSION SUBCUTANEOUS at 13:00

## 2017-03-18 RX ADMIN — LOSARTAN POTASSIUM 25 MILLIGRAM(S): 100 TABLET, FILM COATED ORAL at 05:20

## 2017-03-18 RX ADMIN — Medication 1 TABLET(S): at 05:21

## 2017-03-18 RX ADMIN — CARVEDILOL PHOSPHATE 12.5 MILLIGRAM(S): 80 CAPSULE, EXTENDED RELEASE ORAL at 17:25

## 2017-03-18 RX ADMIN — Medication 3: at 17:24

## 2017-03-18 RX ADMIN — Medication 1334 MILLIGRAM(S): at 08:39

## 2017-03-18 RX ADMIN — Medication 1: at 08:38

## 2017-03-18 RX ADMIN — Medication 75 MILLIGRAM(S): at 17:29

## 2017-03-18 RX ADMIN — HEPARIN SODIUM 5000 UNIT(S): 5000 INJECTION INTRAVENOUS; SUBCUTANEOUS at 05:20

## 2017-03-18 RX ADMIN — Medication 1 TABLET(S): at 13:00

## 2017-03-18 RX ADMIN — Medication 100 MILLIGRAM(S): at 21:43

## 2017-03-18 RX ADMIN — Medication 3 MILLILITER(S): at 17:32

## 2017-03-19 LAB
AMMONIA BLD-MCNC: 25 UMOL/L — SIGNIFICANT CHANGE UP (ref 11–55)
ANION GAP SERPL CALC-SCNC: 16 MMOL/L — SIGNIFICANT CHANGE UP (ref 5–17)
BUN SERPL-MCNC: 36 MG/DL — HIGH (ref 7–23)
CALCIUM SERPL-MCNC: 8.9 MG/DL — SIGNIFICANT CHANGE UP (ref 8.4–10.5)
CHLORIDE SERPL-SCNC: 95 MMOL/L — LOW (ref 96–108)
CO2 SERPL-SCNC: 22 MMOL/L — SIGNIFICANT CHANGE UP (ref 22–31)
CREAT SERPL-MCNC: 4.33 MG/DL — HIGH (ref 0.5–1.3)
GLUCOSE SERPL-MCNC: 121 MG/DL — HIGH (ref 70–99)
HCT VFR BLD CALC: 31.3 % — LOW (ref 34.5–45)
HGB BLD-MCNC: 9.7 G/DL — LOW (ref 11.5–15.5)
MAGNESIUM SERPL-MCNC: 2 MG/DL — SIGNIFICANT CHANGE UP (ref 1.6–2.6)
MCHC RBC-ENTMCNC: 31 GM/DL — LOW (ref 32–36)
MCHC RBC-ENTMCNC: 31.3 PG — SIGNIFICANT CHANGE UP (ref 27–34)
MCV RBC AUTO: 101 FL — HIGH (ref 80–100)
PLATELET # BLD AUTO: 142 K/UL — LOW (ref 150–400)
POTASSIUM SERPL-MCNC: 4.5 MMOL/L — SIGNIFICANT CHANGE UP (ref 3.5–5.3)
POTASSIUM SERPL-SCNC: 4.5 MMOL/L — SIGNIFICANT CHANGE UP (ref 3.5–5.3)
RBC # BLD: 3.1 M/UL — LOW (ref 3.8–5.2)
RBC # FLD: 19.2 % — HIGH (ref 10.3–14.5)
SODIUM SERPL-SCNC: 134 MMOL/L — LOW (ref 135–145)
WBC # BLD: 6.44 K/UL — SIGNIFICANT CHANGE UP (ref 3.8–10.5)
WBC # FLD AUTO: 6.44 K/UL — SIGNIFICANT CHANGE UP (ref 3.8–10.5)

## 2017-03-19 PROCEDURE — 99232 SBSQ HOSP IP/OBS MODERATE 35: CPT

## 2017-03-19 PROCEDURE — 99233 SBSQ HOSP IP/OBS HIGH 50: CPT

## 2017-03-19 RX ADMIN — Medication 1334 MILLIGRAM(S): at 17:22

## 2017-03-19 RX ADMIN — Medication 3 MILLILITER(S): at 12:40

## 2017-03-19 RX ADMIN — Medication 100 MILLIGRAM(S): at 22:34

## 2017-03-19 RX ADMIN — SIMVASTATIN 40 MILLIGRAM(S): 20 TABLET, FILM COATED ORAL at 22:34

## 2017-03-19 RX ADMIN — Medication 3 MILLILITER(S): at 17:31

## 2017-03-19 RX ADMIN — Medication 4: at 22:35

## 2017-03-19 RX ADMIN — HEPARIN SODIUM 5000 UNIT(S): 5000 INJECTION INTRAVENOUS; SUBCUTANEOUS at 17:23

## 2017-03-19 RX ADMIN — CLOPIDOGREL BISULFATE 75 MILLIGRAM(S): 75 TABLET, FILM COATED ORAL at 12:40

## 2017-03-19 RX ADMIN — Medication 3 MILLILITER(S): at 00:15

## 2017-03-19 RX ADMIN — Medication 1: at 12:40

## 2017-03-19 RX ADMIN — Medication 75 MILLIGRAM(S): at 17:27

## 2017-03-19 RX ADMIN — Medication 1: at 17:21

## 2017-03-19 RX ADMIN — Medication 3 MILLILITER(S): at 05:25

## 2017-03-19 RX ADMIN — HUMAN INSULIN 10 UNIT(S): 100 INJECTION, SUSPENSION SUBCUTANEOUS at 12:43

## 2017-03-19 RX ADMIN — Medication 10 MILLIGRAM(S): at 17:24

## 2017-03-19 RX ADMIN — Medication 75 MILLIGRAM(S): at 05:24

## 2017-03-19 RX ADMIN — Medication 1334 MILLIGRAM(S): at 12:41

## 2017-03-19 RX ADMIN — CARVEDILOL PHOSPHATE 12.5 MILLIGRAM(S): 80 CAPSULE, EXTENDED RELEASE ORAL at 17:23

## 2017-03-19 RX ADMIN — LOSARTAN POTASSIUM 25 MILLIGRAM(S): 100 TABLET, FILM COATED ORAL at 05:23

## 2017-03-19 RX ADMIN — HEPARIN SODIUM 5000 UNIT(S): 5000 INJECTION INTRAVENOUS; SUBCUTANEOUS at 05:22

## 2017-03-19 RX ADMIN — Medication 1334 MILLIGRAM(S): at 09:28

## 2017-03-19 RX ADMIN — Medication 1 TABLET(S): at 12:41

## 2017-03-19 RX ADMIN — Medication 3 MILLILITER(S): at 22:35

## 2017-03-19 RX ADMIN — CARVEDILOL PHOSPHATE 12.5 MILLIGRAM(S): 80 CAPSULE, EXTENDED RELEASE ORAL at 05:22

## 2017-03-20 LAB
ANION GAP SERPL CALC-SCNC: 11 MMOL/L — SIGNIFICANT CHANGE UP (ref 5–17)
ANION GAP SERPL CALC-SCNC: 14 MMOL/L — SIGNIFICANT CHANGE UP (ref 5–17)
BUN SERPL-MCNC: 26 MG/DL — HIGH (ref 7–23)
BUN SERPL-MCNC: 55 MG/DL — HIGH (ref 7–23)
CALCIUM SERPL-MCNC: 8.8 MG/DL — SIGNIFICANT CHANGE UP (ref 8.4–10.5)
CALCIUM SERPL-MCNC: 8.8 MG/DL — SIGNIFICANT CHANGE UP (ref 8.4–10.5)
CHLORIDE SERPL-SCNC: 97 MMOL/L — SIGNIFICANT CHANGE UP (ref 96–108)
CHLORIDE SERPL-SCNC: 97 MMOL/L — SIGNIFICANT CHANGE UP (ref 96–108)
CO2 SERPL-SCNC: 24 MMOL/L — SIGNIFICANT CHANGE UP (ref 22–31)
CO2 SERPL-SCNC: 28 MMOL/L — SIGNIFICANT CHANGE UP (ref 22–31)
CREAT SERPL-MCNC: 3.12 MG/DL — HIGH (ref 0.5–1.3)
CREAT SERPL-MCNC: 5.41 MG/DL — HIGH (ref 0.5–1.3)
GLUCOSE SERPL-MCNC: 157 MG/DL — HIGH (ref 70–99)
GLUCOSE SERPL-MCNC: 401 MG/DL — HIGH (ref 70–99)
POTASSIUM SERPL-MCNC: 4.5 MMOL/L — SIGNIFICANT CHANGE UP (ref 3.5–5.3)
POTASSIUM SERPL-MCNC: 5.4 MMOL/L — HIGH (ref 3.5–5.3)
POTASSIUM SERPL-SCNC: 4.5 MMOL/L — SIGNIFICANT CHANGE UP (ref 3.5–5.3)
POTASSIUM SERPL-SCNC: 5.4 MMOL/L — HIGH (ref 3.5–5.3)
SODIUM SERPL-SCNC: 134 MMOL/L — LOW (ref 135–145)
SODIUM SERPL-SCNC: 136 MMOL/L — SIGNIFICANT CHANGE UP (ref 135–145)

## 2017-03-20 PROCEDURE — 99233 SBSQ HOSP IP/OBS HIGH 50: CPT | Mod: GC

## 2017-03-20 PROCEDURE — 99233 SBSQ HOSP IP/OBS HIGH 50: CPT

## 2017-03-20 RX ORDER — ERYTHROPOIETIN 10000 [IU]/ML
10000 INJECTION, SOLUTION INTRAVENOUS; SUBCUTANEOUS
Qty: 0 | Refills: 0 | Status: DISCONTINUED | OUTPATIENT
Start: 2017-03-20 | End: 2017-03-24

## 2017-03-20 RX ADMIN — Medication 75 MILLIGRAM(S): at 06:51

## 2017-03-20 RX ADMIN — Medication 1334 MILLIGRAM(S): at 08:42

## 2017-03-20 RX ADMIN — Medication 1: at 17:46

## 2017-03-20 RX ADMIN — Medication 3 MILLILITER(S): at 06:52

## 2017-03-20 RX ADMIN — Medication 3 MILLILITER(S): at 12:24

## 2017-03-20 RX ADMIN — SIMVASTATIN 40 MILLIGRAM(S): 20 TABLET, FILM COATED ORAL at 23:18

## 2017-03-20 RX ADMIN — Medication 3 MILLILITER(S): at 23:20

## 2017-03-20 RX ADMIN — Medication 75 MILLIGRAM(S): at 17:54

## 2017-03-20 RX ADMIN — HEPARIN SODIUM 5000 UNIT(S): 5000 INJECTION INTRAVENOUS; SUBCUTANEOUS at 17:51

## 2017-03-20 RX ADMIN — CLOPIDOGREL BISULFATE 75 MILLIGRAM(S): 75 TABLET, FILM COATED ORAL at 12:24

## 2017-03-20 RX ADMIN — Medication 1334 MILLIGRAM(S): at 12:24

## 2017-03-20 RX ADMIN — ERYTHROPOIETIN 10000 UNIT(S): 10000 INJECTION, SOLUTION INTRAVENOUS; SUBCUTANEOUS at 16:03

## 2017-03-20 RX ADMIN — Medication 1 TABLET(S): at 12:25

## 2017-03-20 RX ADMIN — Medication 3 MILLILITER(S): at 17:51

## 2017-03-20 RX ADMIN — HEPARIN SODIUM 5000 UNIT(S): 5000 INJECTION INTRAVENOUS; SUBCUTANEOUS at 06:51

## 2017-03-20 RX ADMIN — CARVEDILOL PHOSPHATE 12.5 MILLIGRAM(S): 80 CAPSULE, EXTENDED RELEASE ORAL at 06:51

## 2017-03-20 RX ADMIN — Medication 6: at 23:17

## 2017-03-20 RX ADMIN — Medication 2: at 08:39

## 2017-03-20 RX ADMIN — Medication 100 MILLIGRAM(S): at 23:17

## 2017-03-20 RX ADMIN — HUMAN INSULIN 10 UNIT(S): 100 INJECTION, SUSPENSION SUBCUTANEOUS at 14:07

## 2017-03-20 RX ADMIN — Medication 3: at 12:25

## 2017-03-20 RX ADMIN — CARVEDILOL PHOSPHATE 12.5 MILLIGRAM(S): 80 CAPSULE, EXTENDED RELEASE ORAL at 17:51

## 2017-03-20 RX ADMIN — LOSARTAN POTASSIUM 25 MILLIGRAM(S): 100 TABLET, FILM COATED ORAL at 06:51

## 2017-03-20 RX ADMIN — Medication 1334 MILLIGRAM(S): at 17:51

## 2017-03-21 PROCEDURE — 99232 SBSQ HOSP IP/OBS MODERATE 35: CPT

## 2017-03-21 PROCEDURE — 93010 ELECTROCARDIOGRAM REPORT: CPT

## 2017-03-21 PROCEDURE — 99233 SBSQ HOSP IP/OBS HIGH 50: CPT

## 2017-03-21 RX ORDER — INSULIN GLARGINE 100 [IU]/ML
10 INJECTION, SOLUTION SUBCUTANEOUS AT BEDTIME
Qty: 0 | Refills: 0 | Status: DISCONTINUED | OUTPATIENT
Start: 2017-03-21 | End: 2017-03-24

## 2017-03-21 RX ORDER — DEXTROSE 50 % IN WATER 50 %
12.5 SYRINGE (ML) INTRAVENOUS ONCE
Qty: 0 | Refills: 0 | Status: DISCONTINUED | OUTPATIENT
Start: 2017-03-21 | End: 2017-03-24

## 2017-03-21 RX ORDER — DEXTROSE 50 % IN WATER 50 %
25 SYRINGE (ML) INTRAVENOUS ONCE
Qty: 0 | Refills: 0 | Status: DISCONTINUED | OUTPATIENT
Start: 2017-03-21 | End: 2017-03-24

## 2017-03-21 RX ORDER — INSULIN LISPRO 100/ML
3 VIAL (ML) SUBCUTANEOUS
Qty: 0 | Refills: 0 | Status: DISCONTINUED | OUTPATIENT
Start: 2017-03-21 | End: 2017-03-24

## 2017-03-21 RX ORDER — DEXTROSE 50 % IN WATER 50 %
1 SYRINGE (ML) INTRAVENOUS ONCE
Qty: 0 | Refills: 0 | Status: DISCONTINUED | OUTPATIENT
Start: 2017-03-21 | End: 2017-03-24

## 2017-03-21 RX ORDER — GLUCAGON INJECTION, SOLUTION 0.5 MG/.1ML
1 INJECTION, SOLUTION SUBCUTANEOUS ONCE
Qty: 0 | Refills: 0 | Status: DISCONTINUED | OUTPATIENT
Start: 2017-03-21 | End: 2017-03-24

## 2017-03-21 RX ORDER — SODIUM CHLORIDE 9 MG/ML
1000 INJECTION, SOLUTION INTRAVENOUS
Qty: 0 | Refills: 0 | Status: DISCONTINUED | OUTPATIENT
Start: 2017-03-21 | End: 2017-03-24

## 2017-03-21 RX ADMIN — CLOPIDOGREL BISULFATE 75 MILLIGRAM(S): 75 TABLET, FILM COATED ORAL at 12:29

## 2017-03-21 RX ADMIN — INSULIN GLARGINE 10 UNIT(S): 100 INJECTION, SOLUTION SUBCUTANEOUS at 21:40

## 2017-03-21 RX ADMIN — Medication 75 MILLIGRAM(S): at 05:58

## 2017-03-21 RX ADMIN — HEPARIN SODIUM 5000 UNIT(S): 5000 INJECTION INTRAVENOUS; SUBCUTANEOUS at 17:15

## 2017-03-21 RX ADMIN — HEPARIN SODIUM 5000 UNIT(S): 5000 INJECTION INTRAVENOUS; SUBCUTANEOUS at 05:58

## 2017-03-21 RX ADMIN — Medication 3 MILLILITER(S): at 23:11

## 2017-03-21 RX ADMIN — Medication 3 MILLILITER(S): at 17:16

## 2017-03-21 RX ADMIN — SIMVASTATIN 40 MILLIGRAM(S): 20 TABLET, FILM COATED ORAL at 21:40

## 2017-03-21 RX ADMIN — Medication 100 MILLIGRAM(S): at 21:40

## 2017-03-21 RX ADMIN — Medication 1 TABLET(S): at 12:29

## 2017-03-21 RX ADMIN — CARVEDILOL PHOSPHATE 12.5 MILLIGRAM(S): 80 CAPSULE, EXTENDED RELEASE ORAL at 05:58

## 2017-03-21 RX ADMIN — Medication 2: at 12:30

## 2017-03-21 RX ADMIN — Medication 1334 MILLIGRAM(S): at 08:09

## 2017-03-21 RX ADMIN — CARVEDILOL PHOSPHATE 12.5 MILLIGRAM(S): 80 CAPSULE, EXTENDED RELEASE ORAL at 17:16

## 2017-03-21 RX ADMIN — LOSARTAN POTASSIUM 25 MILLIGRAM(S): 100 TABLET, FILM COATED ORAL at 05:58

## 2017-03-21 RX ADMIN — Medication 1334 MILLIGRAM(S): at 12:29

## 2017-03-21 RX ADMIN — Medication 3 UNIT(S): at 17:17

## 2017-03-21 RX ADMIN — Medication 3 MILLILITER(S): at 05:58

## 2017-03-21 RX ADMIN — Medication 75 MILLIGRAM(S): at 17:16

## 2017-03-21 RX ADMIN — Medication 3 MILLILITER(S): at 12:29

## 2017-03-21 RX ADMIN — Medication 1334 MILLIGRAM(S): at 17:16

## 2017-03-21 RX ADMIN — HUMAN INSULIN 10 UNIT(S): 100 INJECTION, SUSPENSION SUBCUTANEOUS at 08:00

## 2017-03-21 NOTE — DISCHARGE NOTE ADULT - HOME CARE AGENCY
[facility] for  Disease/Symptoms management, medication Reconciliation and Home PT services [phone]

## 2017-03-21 NOTE — DISCHARGE NOTE ADULT - CARE PROVIDER_API CALL
PRIMARY MEDICAL DOCTOR,   Phone: (   )    -  Fax: (   )    - CARDIOLOGY,   Dr. Peck  Phone: (   )    -  Fax: (   )    - CARDIOLOGY,   Dr. Peck  Phone: (   )    -  Fax: (   )    -    PRIMARY MEDICAL DOCTOR,   Dr. Percy Doherty  [phone]  Phone: (   )    -  Fax: (   )    -

## 2017-03-21 NOTE — DISCHARGE NOTE ADULT - MEDICATION SUMMARY - MEDICATIONS TO TAKE
I will START or STAY ON the medications listed below when I get home from the hospital:    losartan 25 mg oral tablet  -- 1 tab(s) by mouth once a day  -- Indication: For HTN    LYRICA 75 MG CAPSULE  -- 1 cap(s) by mouth 2 times a day  -- Indication: For PAin    traZODone 100 mg oral tablet  -- 1 tab(s) by mouth once a day (at bedtime)  -- Indication: For Depression      LANTUS 100 UNITS/ML VIAL  -- Indication: For Diabetes    metoclopramide 10 mg oral tablet  -- 1 tab(s) by mouth every 8 hours, As needed, nausea/vomiting  -- Indication: For Nausea    simvastatin 40 mg oral tablet  -- 1 tab(s) by mouth once a day (at bedtime)  -- Indication: For Antihyperlipidemic    clopidogrel 75 mg oral tablet  -- 1 tab(s) by mouth once a day  -- Indication: For Antiplatelet    carvedilol 12.5 mg oral tablet  -- 1 tab(s) by mouth every 12 hours  -- Indication: For HTN    calcium acetate 667 mg oral tablet  -- 1 tab(s) by mouth 3 times a day  -- Indication: For Supplement    multivitamin  -- 1 tab(s) by mouth once a day  -- Indication: For Supplement    DENISE-JOSEPH RX TABLET  -- 1 tab(s) by mouth once a day  -- Indication: For Supplement I will START or STAY ON the medications listed below when I get home from the hospital:    losartan 25 mg oral tablet  -- 1 tab(s) by mouth once a day  -- Indication: For HTN    LYRICA 75 MG CAPSULE  -- 1 cap(s) by mouth 2 times a day MDD:2  -- Indication: For Pain    traZODone 100 mg oral tablet  -- 1 tab(s) by mouth once a day (at bedtime)  -- Indication: For Depression      LANTUS 100 UNITS/ML VIAL  -- Indication: For Diabetes    metoclopramide 10 mg oral tablet  -- 1 tab(s) by mouth every 8 hours, As needed, nausea/vomiting  -- Indication: For Nausea    simvastatin 40 mg oral tablet  -- 1 tab(s) by mouth once a day (at bedtime)  -- Indication: For HLD    clopidogrel 75 mg oral tablet  -- 1 tab(s) by mouth once a day  -- Indication: For CAD    carvedilol 12.5 mg oral tablet  -- 1 tab(s) by mouth every 12 hours  -- Indication: For Heart failure    calcium acetate 667 mg oral tablet  -- 1 tab(s) by mouth 3 times a day  -- Indication: For Supplement    multivitamin  -- 1 tab(s) by mouth once a day  -- Indication: For Supplement    DENISE-JOSEPH RX TABLET  -- 1 tab(s) by mouth once a day  -- Indication: For Supplement

## 2017-03-21 NOTE — DISCHARGE NOTE ADULT - MEDICATION SUMMARY - MEDICATIONS TO STOP TAKING
I will STOP taking the medications listed below when I get home from the hospital:    FUROSEMIDE 80 MG TABLET    TORSEMIDE 100 MG TABLET  -- 1 tab(s) by mouth once a day

## 2017-03-21 NOTE — DISCHARGE NOTE ADULT - SECONDARY DIAGNOSIS.
Hypoxia Pneumonia due to infectious organism, unspecified laterality, unspecified part of lung Essential hypertension Type 2 diabetes mellitus with diabetic nephropathy, with long-term current use of insulin ESRD (end stage renal disease) on dialysis

## 2017-03-21 NOTE — PHYSICAL THERAPY INITIAL EVALUATION ADULT - PERTINENT HX OF CURRENT PROBLEM, REHAB EVAL
71yoF p/w worsening orthopnea and SOB x2 weeks associated with a pressure like chest pain, found to be acute pleural edema and respiratory distress, 3/17/17 CT Chest, R lung concerning for PNA, Trace R pleural effusion, Mod L pleural effusion, 3/17/17 TTE, severe dilated L atrium, severe L ventricular enlargement, Severe L ventricular systolic dysfunction

## 2017-03-21 NOTE — DISCHARGE NOTE ADULT - PATIENT PORTAL LINK FT
“You can access the FollowHealth Patient Portal, offered by [facility], by registering with the following website: [email or web address]”

## 2017-03-21 NOTE — DISCHARGE NOTE ADULT - PLAN OF CARE
Resolved Follow up with your primary medical doctor within a week. Follow up with your pulmonologist. Pneumonia is a lung infection that can cause a fever, cough, and trouble breathing.  Continue all antibiotics as ordered until complete.  Nutrition is important, eat small frequent meals.  Get lots of rest and drink fluids.  Call your health care provider upon arrival home from hospital and make a follow up appointment for one week.  If your cough worsens, you develop fever greater than 101', you have shaking chills, a fast heartbeat, trouble breathing and/or feel your are breathing much faster than usual, call your healthcare provider.  Make sure you wash your hands frequently. Take medications for your blood pressure as recommended.  Eat a heart healthy diet that is low in saturated fats and salt, and includes whole grains, fruits, vegetables and lean protein   Exercise regularly (consult with your physician or cardiologist first); maintain a heart healthy weight.   If you smoke - quit (A resource to help you stop smoking is the [facility] – phone number 393-905-9338.). Continue to follow with your primary physician or cardiologist.   Seek medical help for dizziness, Lightheadedness, Blurry vision, Headache, Chest pain, Shortness of breath  Follow up with your medical doctor to establish long term blood pressure treatment goals. Your next appointment with endocrinologist ([facility] endocrine ph: [phone])/PMD is -   HgA1C this admission -  Make sure you get your HgA1c checked every three months.  If you take oral diabetes medications, check your blood glucose two times a day.  If you take insulin, check your blood glucose before meals and at bedtime.  It's important not to skip any meals.  Keep a log of your blood glucose results and always take it with you to your doctor appointments.  Keep a list of your current medications including injectables and over the counter medications and bring this medication list with you to all your doctor appointments.  If you have not seen your ophthalmologist this year call for appointment.  Check your feet daily for redness, sores, or openings. Do not self treat. If no improvement in two days call your primary care physician for an appointment.  Low blood sugar (hypoglycemia) is a blood sugar below 70mg/dl. Check your blood sugar if you feel signs/symptoms of hypoglycemia. If your blood sugar is below 70 take 15 grams of carbohydrates (ex 4 oz of apple juice, 3-4 glucose tablets, or 4-6 oz of regular soda) wait 15 minutes and repeat blood sugar to make sure it comes up above 70.  If your blood sugar is above 70 and you are due for a meal, have a meal.  If you are not due for a meal have a snack.  This snack helps keeps your blood sugar at a safe range. Avoid taking (NSAIDs) - (ex: Ibuprofen, Advil, Celebrex, Naprosyn)  Avoid taking any nephrotoxic agents (can harm kidneys) - Intravenous contrast for diagnostic testing, combination cold medications.  Have all medications adjusted for your renal function by your Health Care Provider.  Blood pressure control is important.  Take all medication as prescribed. Continue with dialysis as outpatient.

## 2017-03-21 NOTE — DISCHARGE NOTE ADULT - PROVIDER TOKENS
FREE:[LAST:[PRIMARY MEDICAL DOCTOR],PHONE:[(   )    -],FAX:[(   )    -]] FREE:[LAST:[CARDIOLOGY],PHONE:[(   )    -],FAX:[(   )    -],ADDRESS:[Dr. Peck]] FREE:[LAST:[CARDIOLOGY],PHONE:[(   )    -],FAX:[(   )    -],ADDRESS:[Dr. Peck]],FREE:[LAST:[PRIMARY MEDICAL DOCTOR],PHONE:[(   )    -],FAX:[(   )    -],ADDRESS:[Dr. Percy Doherty  [phone]]]

## 2017-03-21 NOTE — PHYSICAL THERAPY INITIAL EVALUATION ADULT - ADDITIONAL COMMENTS
as per pt, resides in a  with spouse, no stair to enter, 7 stairs indoor to lower level for kitchen with R HR, PTA, pt (I) with amb without AD, (I) with ADLs, no HHA

## 2017-03-21 NOTE — PHYSICAL THERAPY INITIAL EVALUATION ADULT - DISCHARGE DISPOSITION, PT EVAL
home w/ home PT/home/home w/ assist/Home with Home PT for strengthening, bed mob, transfer, gait and balance training, home with assist as needed.

## 2017-03-21 NOTE — DISCHARGE NOTE ADULT - ADDITIONAL INSTRUCTIONS
Follow up with your primary medical doctor and out patient dialysis.  Home care services and physical therapy are scheduled.

## 2017-03-21 NOTE — DISCHARGE NOTE ADULT - HOSPITAL COURSE
To be completed by attending 71 year old female with PMHx of HTN, anxiety, depression, DM, ESRD s/p infection from colonoscopy, on dialysis at [facility], presents with cough productive of white sputum and dyspnea, and worsening orthopnea x several weeks  :pt found  lethargic likely sec to narcotics- NO FURTHER SEDATION   		  	cards :- TTE: Systolic (EF 10-15%) and Diastolic (stage 2 / mod pHTN) Heart Failure  	- ID Consult ([place]) - Hold off on abx   	- Head CT: no acute findings  	- Chest CT: possible right sided PNA, moderate left pleural effusion.   03/18  Pt had 10 beats of WCT, K/Mg:   3/18: Device interrogated (PPM vs ICD?? - awaiting interrogation report - assuming ICD 		given EF 10%)  	- Pleural effusion  - no indication for thoracentesis as per pulm  	 dyspnea nad  Mental status much improved after HD

## 2017-03-21 NOTE — DISCHARGE NOTE ADULT - CARE PLAN
Principal Discharge DX:	Acute pulmonary edema  Goal:	Resolved  Instructions for follow-up, activity and diet:	Follow up with your primary medical doctor within a week.  Secondary Diagnosis:	Hypoxia  Instructions for follow-up, activity and diet:	Follow up with your pulmonologist.  Secondary Diagnosis:	Pneumonia due to infectious organism, unspecified laterality, unspecified part of lung  Instructions for follow-up, activity and diet:	Pneumonia is a lung infection that can cause a fever, cough, and trouble breathing.  Continue all antibiotics as ordered until complete.  Nutrition is important, eat small frequent meals.  Get lots of rest and drink fluids.  Call your health care provider upon arrival home from hospital and make a follow up appointment for one week.  If your cough worsens, you develop fever greater than 101', you have shaking chills, a fast heartbeat, trouble breathing and/or feel your are breathing much faster than usual, call your healthcare provider.  Make sure you wash your hands frequently.  Secondary Diagnosis:	Essential hypertension  Instructions for follow-up, activity and diet:	Take medications for your blood pressure as recommended.  Eat a heart healthy diet that is low in saturated fats and salt, and includes whole grains, fruits, vegetables and lean protein   Exercise regularly (consult with your physician or cardiologist first); maintain a heart healthy weight.   If you smoke - quit (A resource to help you stop smoking is the [facility] – phone number 209-078-4588.). Continue to follow with your primary physician or cardiologist.   Seek medical help for dizziness, Lightheadedness, Blurry vision, Headache, Chest pain, Shortness of breath  Follow up with your medical doctor to establish long term blood pressure treatment goals.  Secondary Diagnosis:	Type 2 diabetes mellitus with diabetic nephropathy, with long-term current use of insulin  Instructions for follow-up, activity and diet:	Your next appointment with endocrinologist ([facility] endocrine ph: [phone])/PMD is -   HgA1C this admission -  Make sure you get your HgA1c checked every three months.  If you take oral diabetes medications, check your blood glucose two times a day.  If you take insulin, check your blood glucose before meals and at bedtime.  It's important not to skip any meals.  Keep a log of your blood glucose results and always take it with you to your doctor appointments.  Keep a list of your current medications including injectables and over the counter medications and bring this medication list with you to all your doctor appointments.  If you have not seen your ophthalmologist this year call for appointment.  Check your feet daily for redness, sores, or openings. Do not self treat. If no improvement in two days call your primary care physician for an appointment.  Low blood sugar (hypoglycemia) is a blood sugar below 70mg/dl. Check your blood sugar if you feel signs/symptoms of hypoglycemia. If your blood sugar is below 70 take 15 grams of carbohydrates (ex 4 oz of apple juice, 3-4 glucose tablets, or 4-6 oz of regular soda) wait 15 minutes and repeat blood sugar to make sure it comes up above 70.  If your blood sugar is above 70 and you are due for a meal, have a meal.  If you are not due for a meal have a snack.  This snack helps keeps your blood sugar at a safe range.  Secondary Diagnosis:	ESRD (end stage renal disease) on dialysis  Goal:	Continue with dialysis as outpatient.  Instructions for follow-up, activity and diet:	Avoid taking (NSAIDs) - (ex: Ibuprofen, Advil, Celebrex, Naprosyn)  Avoid taking any nephrotoxic agents (can harm kidneys) - Intravenous contrast for diagnostic testing, combination cold medications.  Have all medications adjusted for your renal function by your Health Care Provider.  Blood pressure control is important.  Take all medication as prescribed. Principal Discharge DX:	Acute pulmonary edema  Goal:	Resolved  Instructions for follow-up, activity and diet:	Follow up with your primary medical doctor within a week.  Secondary Diagnosis:	Hypoxia  Instructions for follow-up, activity and diet:	Follow up with your pulmonologist.  Secondary Diagnosis:	Pneumonia due to infectious organism, unspecified laterality, unspecified part of lung  Instructions for follow-up, activity and diet:	Pneumonia is a lung infection that can cause a fever, cough, and trouble breathing.  Continue all antibiotics as ordered until complete.  Nutrition is important, eat small frequent meals.  Get lots of rest and drink fluids.  Call your health care provider upon arrival home from hospital and make a follow up appointment for one week.  If your cough worsens, you develop fever greater than 101', you have shaking chills, a fast heartbeat, trouble breathing and/or feel your are breathing much faster than usual, call your healthcare provider.  Make sure you wash your hands frequently.  Secondary Diagnosis:	Essential hypertension  Instructions for follow-up, activity and diet:	Take medications for your blood pressure as recommended.  Eat a heart healthy diet that is low in saturated fats and salt, and includes whole grains, fruits, vegetables and lean protein   Exercise regularly (consult with your physician or cardiologist first); maintain a heart healthy weight.   If you smoke - quit (A resource to help you stop smoking is the [facility] – phone number 411-729-9701.). Continue to follow with your primary physician or cardiologist.   Seek medical help for dizziness, Lightheadedness, Blurry vision, Headache, Chest pain, Shortness of breath  Follow up with your medical doctor to establish long term blood pressure treatment goals.  Secondary Diagnosis:	Type 2 diabetes mellitus with diabetic nephropathy, with long-term current use of insulin  Instructions for follow-up, activity and diet:	Your next appointment with endocrinologist ([facility] endocrine ph: [phone])/PMD is -   HgA1C this admission -  Make sure you get your HgA1c checked every three months.  If you take oral diabetes medications, check your blood glucose two times a day.  If you take insulin, check your blood glucose before meals and at bedtime.  It's important not to skip any meals.  Keep a log of your blood glucose results and always take it with you to your doctor appointments.  Keep a list of your current medications including injectables and over the counter medications and bring this medication list with you to all your doctor appointments.  If you have not seen your ophthalmologist this year call for appointment.  Check your feet daily for redness, sores, or openings. Do not self treat. If no improvement in two days call your primary care physician for an appointment.  Low blood sugar (hypoglycemia) is a blood sugar below 70mg/dl. Check your blood sugar if you feel signs/symptoms of hypoglycemia. If your blood sugar is below 70 take 15 grams of carbohydrates (ex 4 oz of apple juice, 3-4 glucose tablets, or 4-6 oz of regular soda) wait 15 minutes and repeat blood sugar to make sure it comes up above 70.  If your blood sugar is above 70 and you are due for a meal, have a meal.  If you are not due for a meal have a snack.  This snack helps keeps your blood sugar at a safe range.  Secondary Diagnosis:	ESRD (end stage renal disease) on dialysis  Goal:	Continue with dialysis as outpatient.  Instructions for follow-up, activity and diet:	Avoid taking (NSAIDs) - (ex: Ibuprofen, Advil, Celebrex, Naprosyn)  Avoid taking any nephrotoxic agents (can harm kidneys) - Intravenous contrast for diagnostic testing, combination cold medications.  Have all medications adjusted for your renal function by your Health Care Provider.  Blood pressure control is important.  Take all medication as prescribed. Principal Discharge DX:	Acute pulmonary edema  Goal:	Resolved  Instructions for follow-up, activity and diet:	Follow up with your primary medical doctor within a week.  Secondary Diagnosis:	Hypoxia  Instructions for follow-up, activity and diet:	Follow up with your pulmonologist.  Secondary Diagnosis:	Pneumonia due to infectious organism, unspecified laterality, unspecified part of lung  Instructions for follow-up, activity and diet:	Pneumonia is a lung infection that can cause a fever, cough, and trouble breathing.  Continue all antibiotics as ordered until complete.  Nutrition is important, eat small frequent meals.  Get lots of rest and drink fluids.  Call your health care provider upon arrival home from hospital and make a follow up appointment for one week.  If your cough worsens, you develop fever greater than 101', you have shaking chills, a fast heartbeat, trouble breathing and/or feel your are breathing much faster than usual, call your healthcare provider.  Make sure you wash your hands frequently.  Secondary Diagnosis:	Essential hypertension  Instructions for follow-up, activity and diet:	Take medications for your blood pressure as recommended.  Eat a heart healthy diet that is low in saturated fats and salt, and includes whole grains, fruits, vegetables and lean protein   Exercise regularly (consult with your physician or cardiologist first); maintain a heart healthy weight.   If you smoke - quit (A resource to help you stop smoking is the [facility] – phone number [phone].). Continue to follow with your primary physician or cardiologist.   Seek medical help for dizziness, Lightheadedness, Blurry vision, Headache, Chest pain, Shortness of breath  Follow up with your medical doctor to establish long term blood pressure treatment goals.  Secondary Diagnosis:	Type 2 diabetes mellitus with diabetic nephropathy, with long-term current use of insulin  Instructions for follow-up, activity and diet:	Your next appointment with endocrinologist ([facility] endocrine ph: [phone])/PMD is -   HgA1C this admission -  Make sure you get your HgA1c checked every three months.  If you take oral diabetes medications, check your blood glucose two times a day.  If you take insulin, check your blood glucose before meals and at bedtime.  It's important not to skip any meals.  Keep a log of your blood glucose results and always take it with you to your doctor appointments.  Keep a list of your current medications including injectables and over the counter medications and bring this medication list with you to all your doctor appointments.  If you have not seen your ophthalmologist this year call for appointment.  Check your feet daily for redness, sores, or openings. Do not self treat. If no improvement in two days call your primary care physician for an appointment.  Low blood sugar (hypoglycemia) is a blood sugar below 70mg/dl. Check your blood sugar if you feel signs/symptoms of hypoglycemia. If your blood sugar is below 70 take 15 grams of carbohydrates (ex 4 oz of apple juice, 3-4 glucose tablets, or 4-6 oz of regular soda) wait 15 minutes and repeat blood sugar to make sure it comes up above 70.  If your blood sugar is above 70 and you are due for a meal, have a meal.  If you are not due for a meal have a snack.  This snack helps keeps your blood sugar at a safe range.  Secondary Diagnosis:	ESRD (end stage renal disease) on dialysis  Goal:	Continue with dialysis as outpatient.  Instructions for follow-up, activity and diet:	Avoid taking (NSAIDs) - (ex: Ibuprofen, Advil, Celebrex, Naprosyn)  Avoid taking any nephrotoxic agents (can harm kidneys) - Intravenous contrast for diagnostic testing, combination cold medications.  Have all medications adjusted for your renal function by your Health Care Provider.  Blood pressure control is important.  Take all medication as prescribed. Principal Discharge DX:	Acute pulmonary edema  Goal:	Resolved  Instructions for follow-up, activity and diet:	Follow up with your primary medical doctor within a week.  Secondary Diagnosis:	Hypoxia  Instructions for follow-up, activity and diet:	Follow up with your pulmonologist.  Secondary Diagnosis:	Pneumonia due to infectious organism, unspecified laterality, unspecified part of lung  Instructions for follow-up, activity and diet:	Pneumonia is a lung infection that can cause a fever, cough, and trouble breathing.  Continue all antibiotics as ordered until complete.  Nutrition is important, eat small frequent meals.  Get lots of rest and drink fluids.  Call your health care provider upon arrival home from hospital and make a follow up appointment for one week.  If your cough worsens, you develop fever greater than 101', you have shaking chills, a fast heartbeat, trouble breathing and/or feel your are breathing much faster than usual, call your healthcare provider.  Make sure you wash your hands frequently.  Secondary Diagnosis:	Essential hypertension  Instructions for follow-up, activity and diet:	Take medications for your blood pressure as recommended.  Eat a heart healthy diet that is low in saturated fats and salt, and includes whole grains, fruits, vegetables and lean protein   Exercise regularly (consult with your physician or cardiologist first); maintain a heart healthy weight.   If you smoke - quit (A resource to help you stop smoking is the [facility] – phone number 821-245-6331.). Continue to follow with your primary physician or cardiologist.   Seek medical help for dizziness, Lightheadedness, Blurry vision, Headache, Chest pain, Shortness of breath  Follow up with your medical doctor to establish long term blood pressure treatment goals.  Secondary Diagnosis:	Type 2 diabetes mellitus with diabetic nephropathy, with long-term current use of insulin  Instructions for follow-up, activity and diet:	Your next appointment with endocrinologist ([facility] endocrine ph: [phone])/PMD is -   HgA1C this admission -  Make sure you get your HgA1c checked every three months.  If you take oral diabetes medications, check your blood glucose two times a day.  If you take insulin, check your blood glucose before meals and at bedtime.  It's important not to skip any meals.  Keep a log of your blood glucose results and always take it with you to your doctor appointments.  Keep a list of your current medications including injectables and over the counter medications and bring this medication list with you to all your doctor appointments.  If you have not seen your ophthalmologist this year call for appointment.  Check your feet daily for redness, sores, or openings. Do not self treat. If no improvement in two days call your primary care physician for an appointment.  Low blood sugar (hypoglycemia) is a blood sugar below 70mg/dl. Check your blood sugar if you feel signs/symptoms of hypoglycemia. If your blood sugar is below 70 take 15 grams of carbohydrates (ex 4 oz of apple juice, 3-4 glucose tablets, or 4-6 oz of regular soda) wait 15 minutes and repeat blood sugar to make sure it comes up above 70.  If your blood sugar is above 70 and you are due for a meal, have a meal.  If you are not due for a meal have a snack.  This snack helps keeps your blood sugar at a safe range.  Secondary Diagnosis:	ESRD (end stage renal disease) on dialysis  Goal:	Continue with dialysis as outpatient.  Instructions for follow-up, activity and diet:	Avoid taking (NSAIDs) - (ex: Ibuprofen, Advil, Celebrex, Naprosyn)  Avoid taking any nephrotoxic agents (can harm kidneys) - Intravenous contrast for diagnostic testing, combination cold medications.  Have all medications adjusted for your renal function by your Health Care Provider.  Blood pressure control is important.  Take all medication as prescribed. Principal Discharge DX:	Acute pulmonary edema  Goal:	Resolved  Instructions for follow-up, activity and diet:	Follow up with your primary medical doctor within a week.  Secondary Diagnosis:	Hypoxia  Instructions for follow-up, activity and diet:	Follow up with your pulmonologist.  Secondary Diagnosis:	Pneumonia due to infectious organism, unspecified laterality, unspecified part of lung  Instructions for follow-up, activity and diet:	Pneumonia is a lung infection that can cause a fever, cough, and trouble breathing.  Continue all antibiotics as ordered until complete.  Nutrition is important, eat small frequent meals.  Get lots of rest and drink fluids.  Call your health care provider upon arrival home from hospital and make a follow up appointment for one week.  If your cough worsens, you develop fever greater than 101', you have shaking chills, a fast heartbeat, trouble breathing and/or feel your are breathing much faster than usual, call your healthcare provider.  Make sure you wash your hands frequently.  Secondary Diagnosis:	Essential hypertension  Instructions for follow-up, activity and diet:	Take medications for your blood pressure as recommended.  Eat a heart healthy diet that is low in saturated fats and salt, and includes whole grains, fruits, vegetables and lean protein   Exercise regularly (consult with your physician or cardiologist first); maintain a heart healthy weight.   If you smoke - quit (A resource to help you stop smoking is the [facility] – phone number 719-121-6799.). Continue to follow with your primary physician or cardiologist.   Seek medical help for dizziness, Lightheadedness, Blurry vision, Headache, Chest pain, Shortness of breath  Follow up with your medical doctor to establish long term blood pressure treatment goals.  Secondary Diagnosis:	Type 2 diabetes mellitus with diabetic nephropathy, with long-term current use of insulin  Instructions for follow-up, activity and diet:	Your next appointment with endocrinologist ([facility] endocrine ph: [phone])/PMD is -   HgA1C this admission -  Make sure you get your HgA1c checked every three months.  If you take oral diabetes medications, check your blood glucose two times a day.  If you take insulin, check your blood glucose before meals and at bedtime.  It's important not to skip any meals.  Keep a log of your blood glucose results and always take it with you to your doctor appointments.  Keep a list of your current medications including injectables and over the counter medications and bring this medication list with you to all your doctor appointments.  If you have not seen your ophthalmologist this year call for appointment.  Check your feet daily for redness, sores, or openings. Do not self treat. If no improvement in two days call your primary care physician for an appointment.  Low blood sugar (hypoglycemia) is a blood sugar below 70mg/dl. Check your blood sugar if you feel signs/symptoms of hypoglycemia. If your blood sugar is below 70 take 15 grams of carbohydrates (ex 4 oz of apple juice, 3-4 glucose tablets, or 4-6 oz of regular soda) wait 15 minutes and repeat blood sugar to make sure it comes up above 70.  If your blood sugar is above 70 and you are due for a meal, have a meal.  If you are not due for a meal have a snack.  This snack helps keeps your blood sugar at a safe range.  Secondary Diagnosis:	ESRD (end stage renal disease) on dialysis  Goal:	Continue with dialysis as outpatient.  Instructions for follow-up, activity and diet:	Avoid taking (NSAIDs) - (ex: Ibuprofen, Advil, Celebrex, Naprosyn)  Avoid taking any nephrotoxic agents (can harm kidneys) - Intravenous contrast for diagnostic testing, combination cold medications.  Have all medications adjusted for your renal function by your Health Care Provider.  Blood pressure control is important.  Take all medication as prescribed. Principal Discharge DX:	Acute pulmonary edema  Goal:	Resolved  Instructions for follow-up, activity and diet:	Follow up with your primary medical doctor within a week.  Secondary Diagnosis:	Hypoxia  Instructions for follow-up, activity and diet:	Follow up with your pulmonologist.  Secondary Diagnosis:	Pneumonia due to infectious organism, unspecified laterality, unspecified part of lung  Instructions for follow-up, activity and diet:	Pneumonia is a lung infection that can cause a fever, cough, and trouble breathing.  Continue all antibiotics as ordered until complete.  Nutrition is important, eat small frequent meals.  Get lots of rest and drink fluids.  Call your health care provider upon arrival home from hospital and make a follow up appointment for one week.  If your cough worsens, you develop fever greater than 101', you have shaking chills, a fast heartbeat, trouble breathing and/or feel your are breathing much faster than usual, call your healthcare provider.  Make sure you wash your hands frequently.  Secondary Diagnosis:	Essential hypertension  Instructions for follow-up, activity and diet:	Take medications for your blood pressure as recommended.  Eat a heart healthy diet that is low in saturated fats and salt, and includes whole grains, fruits, vegetables and lean protein   Exercise regularly (consult with your physician or cardiologist first); maintain a heart healthy weight.   If you smoke - quit (A resource to help you stop smoking is the [facility] – phone number 462-214-5776.). Continue to follow with your primary physician or cardiologist.   Seek medical help for dizziness, Lightheadedness, Blurry vision, Headache, Chest pain, Shortness of breath  Follow up with your medical doctor to establish long term blood pressure treatment goals.  Secondary Diagnosis:	Type 2 diabetes mellitus with diabetic nephropathy, with long-term current use of insulin  Instructions for follow-up, activity and diet:	Your next appointment with endocrinologist ([facility] endocrine ph: [phone])/PMD is -   HgA1C this admission -  Make sure you get your HgA1c checked every three months.  If you take oral diabetes medications, check your blood glucose two times a day.  If you take insulin, check your blood glucose before meals and at bedtime.  It's important not to skip any meals.  Keep a log of your blood glucose results and always take it with you to your doctor appointments.  Keep a list of your current medications including injectables and over the counter medications and bring this medication list with you to all your doctor appointments.  If you have not seen your ophthalmologist this year call for appointment.  Check your feet daily for redness, sores, or openings. Do not self treat. If no improvement in two days call your primary care physician for an appointment.  Low blood sugar (hypoglycemia) is a blood sugar below 70mg/dl. Check your blood sugar if you feel signs/symptoms of hypoglycemia. If your blood sugar is below 70 take 15 grams of carbohydrates (ex 4 oz of apple juice, 3-4 glucose tablets, or 4-6 oz of regular soda) wait 15 minutes and repeat blood sugar to make sure it comes up above 70.  If your blood sugar is above 70 and you are due for a meal, have a meal.  If you are not due for a meal have a snack.  This snack helps keeps your blood sugar at a safe range.  Secondary Diagnosis:	ESRD (end stage renal disease) on dialysis  Goal:	Continue with dialysis as outpatient.  Instructions for follow-up, activity and diet:	Avoid taking (NSAIDs) - (ex: Ibuprofen, Advil, Celebrex, Naprosyn)  Avoid taking any nephrotoxic agents (can harm kidneys) - Intravenous contrast for diagnostic testing, combination cold medications.  Have all medications adjusted for your renal function by your Health Care Provider.  Blood pressure control is important.  Take all medication as prescribed. Principal Discharge DX:	Acute pulmonary edema  Goal:	Resolved  Instructions for follow-up, activity and diet:	Follow up with your primary medical doctor within a week.  Secondary Diagnosis:	Hypoxia  Instructions for follow-up, activity and diet:	Follow up with your pulmonologist.  Secondary Diagnosis:	Pneumonia due to infectious organism, unspecified laterality, unspecified part of lung  Instructions for follow-up, activity and diet:	Pneumonia is a lung infection that can cause a fever, cough, and trouble breathing.  Continue all antibiotics as ordered until complete.  Nutrition is important, eat small frequent meals.  Get lots of rest and drink fluids.  Call your health care provider upon arrival home from hospital and make a follow up appointment for one week.  If your cough worsens, you develop fever greater than 101', you have shaking chills, a fast heartbeat, trouble breathing and/or feel your are breathing much faster than usual, call your healthcare provider.  Make sure you wash your hands frequently.  Secondary Diagnosis:	Essential hypertension  Instructions for follow-up, activity and diet:	Take medications for your blood pressure as recommended.  Eat a heart healthy diet that is low in saturated fats and salt, and includes whole grains, fruits, vegetables and lean protein   Exercise regularly (consult with your physician or cardiologist first); maintain a heart healthy weight.   If you smoke - quit (A resource to help you stop smoking is the [facility] – phone number [phone].). Continue to follow with your primary physician or cardiologist.   Seek medical help for dizziness, Lightheadedness, Blurry vision, Headache, Chest pain, Shortness of breath  Follow up with your medical doctor to establish long term blood pressure treatment goals.  Secondary Diagnosis:	Type 2 diabetes mellitus with diabetic nephropathy, with long-term current use of insulin  Instructions for follow-up, activity and diet:	Your next appointment with endocrinologist ([facility] endocrine ph: [phone])/PMD is -   HgA1C this admission -  Make sure you get your HgA1c checked every three months.  If you take oral diabetes medications, check your blood glucose two times a day.  If you take insulin, check your blood glucose before meals and at bedtime.  It's important not to skip any meals.  Keep a log of your blood glucose results and always take it with you to your doctor appointments.  Keep a list of your current medications including injectables and over the counter medications and bring this medication list with you to all your doctor appointments.  If you have not seen your ophthalmologist this year call for appointment.  Check your feet daily for redness, sores, or openings. Do not self treat. If no improvement in two days call your primary care physician for an appointment.  Low blood sugar (hypoglycemia) is a blood sugar below 70mg/dl. Check your blood sugar if you feel signs/symptoms of hypoglycemia. If your blood sugar is below 70 take 15 grams of carbohydrates (ex 4 oz of apple juice, 3-4 glucose tablets, or 4-6 oz of regular soda) wait 15 minutes and repeat blood sugar to make sure it comes up above 70.  If your blood sugar is above 70 and you are due for a meal, have a meal.  If you are not due for a meal have a snack.  This snack helps keeps your blood sugar at a safe range.  Secondary Diagnosis:	ESRD (end stage renal disease) on dialysis  Goal:	Continue with dialysis as outpatient.  Instructions for follow-up, activity and diet:	Avoid taking (NSAIDs) - (ex: Ibuprofen, Advil, Celebrex, Naprosyn)  Avoid taking any nephrotoxic agents (can harm kidneys) - Intravenous contrast for diagnostic testing, combination cold medications.  Have all medications adjusted for your renal function by your Health Care Provider.  Blood pressure control is important.  Take all medication as prescribed. Principal Discharge DX:	Acute pulmonary edema  Goal:	Resolved  Instructions for follow-up, activity and diet:	Follow up with your primary medical doctor within a week.  Secondary Diagnosis:	Hypoxia  Instructions for follow-up, activity and diet:	Follow up with your pulmonologist.  Secondary Diagnosis:	Pneumonia due to infectious organism, unspecified laterality, unspecified part of lung  Instructions for follow-up, activity and diet:	Pneumonia is a lung infection that can cause a fever, cough, and trouble breathing.  Continue all antibiotics as ordered until complete.  Nutrition is important, eat small frequent meals.  Get lots of rest and drink fluids.  Call your health care provider upon arrival home from hospital and make a follow up appointment for one week.  If your cough worsens, you develop fever greater than 101', you have shaking chills, a fast heartbeat, trouble breathing and/or feel your are breathing much faster than usual, call your healthcare provider.  Make sure you wash your hands frequently.  Secondary Diagnosis:	Essential hypertension  Instructions for follow-up, activity and diet:	Take medications for your blood pressure as recommended.  Eat a heart healthy diet that is low in saturated fats and salt, and includes whole grains, fruits, vegetables and lean protein   Exercise regularly (consult with your physician or cardiologist first); maintain a heart healthy weight.   If you smoke - quit (A resource to help you stop smoking is the [facility] – phone number 295-494-9954.). Continue to follow with your primary physician or cardiologist.   Seek medical help for dizziness, Lightheadedness, Blurry vision, Headache, Chest pain, Shortness of breath  Follow up with your medical doctor to establish long term blood pressure treatment goals.  Secondary Diagnosis:	Type 2 diabetes mellitus with diabetic nephropathy, with long-term current use of insulin  Instructions for follow-up, activity and diet:	Your next appointment with endocrinologist ([facility] endocrine ph: [phone])/PMD is -   HgA1C this admission -  Make sure you get your HgA1c checked every three months.  If you take oral diabetes medications, check your blood glucose two times a day.  If you take insulin, check your blood glucose before meals and at bedtime.  It's important not to skip any meals.  Keep a log of your blood glucose results and always take it with you to your doctor appointments.  Keep a list of your current medications including injectables and over the counter medications and bring this medication list with you to all your doctor appointments.  If you have not seen your ophthalmologist this year call for appointment.  Check your feet daily for redness, sores, or openings. Do not self treat. If no improvement in two days call your primary care physician for an appointment.  Low blood sugar (hypoglycemia) is a blood sugar below 70mg/dl. Check your blood sugar if you feel signs/symptoms of hypoglycemia. If your blood sugar is below 70 take 15 grams of carbohydrates (ex 4 oz of apple juice, 3-4 glucose tablets, or 4-6 oz of regular soda) wait 15 minutes and repeat blood sugar to make sure it comes up above 70.  If your blood sugar is above 70 and you are due for a meal, have a meal.  If you are not due for a meal have a snack.  This snack helps keeps your blood sugar at a safe range.  Secondary Diagnosis:	ESRD (end stage renal disease) on dialysis  Goal:	Continue with dialysis as outpatient.  Instructions for follow-up, activity and diet:	Avoid taking (NSAIDs) - (ex: Ibuprofen, Advil, Celebrex, Naprosyn)  Avoid taking any nephrotoxic agents (can harm kidneys) - Intravenous contrast for diagnostic testing, combination cold medications.  Have all medications adjusted for your renal function by your Health Care Provider.  Blood pressure control is important.  Take all medication as prescribed.

## 2017-03-22 PROCEDURE — 99233 SBSQ HOSP IP/OBS HIGH 50: CPT | Mod: GC

## 2017-03-22 PROCEDURE — 99233 SBSQ HOSP IP/OBS HIGH 50: CPT

## 2017-03-22 RX ADMIN — Medication 1334 MILLIGRAM(S): at 18:09

## 2017-03-22 RX ADMIN — HEPARIN SODIUM 5000 UNIT(S): 5000 INJECTION INTRAVENOUS; SUBCUTANEOUS at 06:21

## 2017-03-22 RX ADMIN — LOSARTAN POTASSIUM 25 MILLIGRAM(S): 100 TABLET, FILM COATED ORAL at 06:21

## 2017-03-22 RX ADMIN — Medication 100 MILLIGRAM(S): at 22:27

## 2017-03-22 RX ADMIN — Medication 3 MILLILITER(S): at 06:21

## 2017-03-22 RX ADMIN — CLOPIDOGREL BISULFATE 75 MILLIGRAM(S): 75 TABLET, FILM COATED ORAL at 12:15

## 2017-03-22 RX ADMIN — Medication 1334 MILLIGRAM(S): at 08:08

## 2017-03-22 RX ADMIN — Medication 1334 MILLIGRAM(S): at 12:14

## 2017-03-22 RX ADMIN — Medication 1 TABLET(S): at 12:15

## 2017-03-22 RX ADMIN — Medication 1: at 08:05

## 2017-03-22 RX ADMIN — SIMVASTATIN 40 MILLIGRAM(S): 20 TABLET, FILM COATED ORAL at 22:27

## 2017-03-22 RX ADMIN — ERYTHROPOIETIN 10000 UNIT(S): 10000 INJECTION, SOLUTION INTRAVENOUS; SUBCUTANEOUS at 14:36

## 2017-03-22 RX ADMIN — CARVEDILOL PHOSPHATE 12.5 MILLIGRAM(S): 80 CAPSULE, EXTENDED RELEASE ORAL at 06:21

## 2017-03-22 RX ADMIN — Medication 3 UNIT(S): at 18:08

## 2017-03-22 RX ADMIN — Medication 75 MILLIGRAM(S): at 06:21

## 2017-03-22 RX ADMIN — Medication 3 UNIT(S): at 08:06

## 2017-03-22 RX ADMIN — Medication 2: at 22:27

## 2017-03-22 RX ADMIN — Medication 3 MILLILITER(S): at 11:55

## 2017-03-22 RX ADMIN — CARVEDILOL PHOSPHATE 12.5 MILLIGRAM(S): 80 CAPSULE, EXTENDED RELEASE ORAL at 18:09

## 2017-03-22 RX ADMIN — Medication 3 MILLILITER(S): at 18:09

## 2017-03-22 RX ADMIN — HEPARIN SODIUM 5000 UNIT(S): 5000 INJECTION INTRAVENOUS; SUBCUTANEOUS at 18:09

## 2017-03-22 RX ADMIN — INSULIN GLARGINE 10 UNIT(S): 100 INJECTION, SOLUTION SUBCUTANEOUS at 22:27

## 2017-03-22 RX ADMIN — Medication 3 UNIT(S): at 12:14

## 2017-03-22 NOTE — PROVIDER CONTACT NOTE (OTHER) - DATE AND TIME:
17-Mar-2017 20:10
18-Mar-2017 04:03
17-Mar-2017 03:00
17-Mar-2017 06:20
17-Mar-2017 07:58
20-Mar-2017 21:48
21-Mar-2017 11:45
22-Mar-2017 09:15
22-Mar-2017 17:00

## 2017-03-22 NOTE — DIETITIAN INITIAL EVALUATION ADULT. - NS AS NUTRI INTERV ED CONTENT
Other (specify)/Renal and T2DM nutrition therapy education materials left at bedside. RD remains available

## 2017-03-22 NOTE — DIETITIAN INITIAL EVALUATION ADULT. - PROBLEM SELECTOR PLAN 4
Patient reports being only on 2 units of lantus qhs which does not seem likely?  Will need to clarify-patient reports that she injects herself without the help of family.  Place on ISS for now and monitor finger sticks, is s/p 6 units of humalog in ED.  Will need standing insulin given hyperglycemia-patient appears to have recently been switched from humalog to Lantus after recent admission at OSH but given her ESRD do feel that NPH may be the better agent for patient.  Will start on NPH 10 units daily for now.

## 2017-03-22 NOTE — DIETITIAN INITIAL EVALUATION ADULT. - ADHERENCE
Pt states she "avoids many foods that are bad for her" but Pt is not specific. Pt does states she eats "diet foods"/fair

## 2017-03-22 NOTE — DIETITIAN INITIAL EVALUATION ADULT. - ORAL INTAKE PTA
good/Pt reports a good PO PTA . Pt states her  preform food shopping and cooking. Breakfast: cereal, milk. Lunch: eggs, vegetables, soup, cheese. Dinner: eggs, cheese, fish. Pt drinks water and apple juice.

## 2017-03-22 NOTE — PROVIDER CONTACT NOTE (OTHER) - BACKGROUND
admitted with cough ,dm, esrd on hemodialysis M-W-F
patient admitted with cough , dm, esrd on dialysis m-w-f
Pt adm for acute pulmonary edema with worsening orthopnea
Pt adm for acute pulmonary edema. EF 10-15%. Tele NSR / paced
Pt admitted for acute pulmonary edema c hx of anxiety
admitted with pneumonia,acute pulmonary edema
dx: Pulmonary edema, on HD  hx: ESRD, DM2, HTN

## 2017-03-22 NOTE — DIETITIAN INITIAL EVALUATION ADULT. - OTHER INFO
Pt seen for length of stay. Pt reports a good PO intake, is consuming 100% of meals in house thus far. Pt accepted menu selection assistance. Pt is not interested in verbal diet education at this time, however accepted written education materials to be left at bedside. Pt reports monitoring BG levels 3x daily and takes Humalog 10 units 2x daily. Hgba1c is 7.1% indicating good control. Pt denies GI distress, states last BM was the  "day before yesterday". Pt takes calcium and MVI. Pt denies chewing/swallowing difficulty. NKFA

## 2017-03-22 NOTE — PROVIDER CONTACT NOTE (OTHER) - REASON
14 beats WCT
Patient confused,on arrival from ER,unable to stay still,also saying chestpain
Patient is noncompliant with pulse oximetry,confused
altered mental status
patient fingerstick was 67 first and then 77
patient k was 6.1
pt blood glucose 404
10 beats WCT
Pt lethargic

## 2017-03-22 NOTE — DIETITIAN INITIAL EVALUATION ADULT. - DIET TYPE
consistent carbohydrate (no snacks)/DASH/TLC (sodium and cholesterol restricted diet)/renal replacement pts:no protein restr,no conc K & phos, low sodium

## 2017-03-22 NOTE — PROVIDER CONTACT NOTE (OTHER) - ASSESSMENT
patient stable as per np patient going for dialysis.dialysis took patient early
patient stable as per np to let her eat food
Pt A&O x 4. Denies chest pain, headache, dizziness, nausea; asymptomatic. VSS /57, HR 78.
Pt very lethargic and only opens eyes when shaken. Pt appears unable to swallow. FS 99, VSS.
Vital stable,see flowsheet
pt A&Ox4, VSS, OOB ad karson. pt denies SOB, CP, pain. pt states she ate cookies and drank apple juice 1 hour ago
pt only arousable to touch, lethagic, vss

## 2017-03-22 NOTE — PROVIDER CONTACT NOTE (OTHER) - ACTION/TREATMENT ORDERED:
will try to keep on,MD notified
NP orders 12 lead EKG & called EP consult. RN d/w NP that cards fellow placed order to dc tele and to please clarify if to maintain tele in light of episode, NP stated she was unable to clarify
José SNIDER aware; will hold meds per orders due to inability to swallow since very lethargic. Will continue to monitor.
Ordered morphine, for chestpain,stat cardiac enzymes sent,ativan was given for extreme anxiety,As trop came back 0.11, no need for 12 lead as per Dr Krishnan.Placed on constant observation for safety
PA notified; will f/u with AM labs and continue to monitor
danilo mike aware will recheck the bmp stat now
as per to give premeal insulin as ordered
np observed pt
recheck F/S in 1 hour, administer humalog as per slinding scale

## 2017-03-22 NOTE — DIETITIAN INITIAL EVALUATION ADULT. - ENERGY NEEDS
Ht: 5'3", Wt: 149.2lbs, BMI: 26.4kg/m2, IBW: 115lbs (+/-10%), 129%IBW  Pertinent information: Pt admitted for SOB and chest pressure. S/p TTE with EF 10-15%. T2DMm, ESRD on HD. Per chart Pt with Pleural effusion, and PNA  +2 generalized Edema, Skin intact

## 2017-03-22 NOTE — PROVIDER CONTACT NOTE (OTHER) - SITUATION
10 beats WCT on tele
Patient is alert confused,so anxious,unable to stay still,trying to get out of bed,multiple times,also c/o chestpain
Patient is confused,agitated,noncompliant with pulseox
Pt in lethargic state after returning from HD; pt has been lethargic since receiving morphine/lorazepam
Pt was treated with morphine lorazepam and temazepam for anxiety and irratibility.  Pt was found to be extremely lethargic and only arousable to touch upon assessment.
pt blood glucose 404
pt had 14 beats WCT on tele - asymptomatic, was asleep at the time - VS are WNL, stable. please note pt previously had episode of 10 beats WCT. she has a PPM/ AICD that was already interrogated 3/18
received patient from s/p dialysis

## 2017-03-22 NOTE — PROVIDER CONTACT NOTE (OTHER) - RECOMMENDATIONS
please note ectopy
patient came floor s/p dialysis at 9383
NP notified, recheck F/S in 1 hour, administer humalog as per slinding scale
np to observe pt
patient ate dinner repeat fs 197

## 2017-03-23 LAB
ANION GAP SERPL CALC-SCNC: 15 MMOL/L — SIGNIFICANT CHANGE UP (ref 5–17)
BUN SERPL-MCNC: 38 MG/DL — HIGH (ref 7–23)
CALCIUM SERPL-MCNC: 9.5 MG/DL — SIGNIFICANT CHANGE UP (ref 8.4–10.5)
CHLORIDE SERPL-SCNC: 96 MMOL/L — SIGNIFICANT CHANGE UP (ref 96–108)
CO2 SERPL-SCNC: 25 MMOL/L — SIGNIFICANT CHANGE UP (ref 22–31)
CREAT SERPL-MCNC: 3.84 MG/DL — HIGH (ref 0.5–1.3)
GLUCOSE SERPL-MCNC: 165 MG/DL — HIGH (ref 70–99)
POTASSIUM SERPL-MCNC: 5 MMOL/L — SIGNIFICANT CHANGE UP (ref 3.5–5.3)
POTASSIUM SERPL-SCNC: 5 MMOL/L — SIGNIFICANT CHANGE UP (ref 3.5–5.3)
SODIUM SERPL-SCNC: 136 MMOL/L — SIGNIFICANT CHANGE UP (ref 135–145)

## 2017-03-23 PROCEDURE — 99233 SBSQ HOSP IP/OBS HIGH 50: CPT

## 2017-03-23 PROCEDURE — 93321 DOPPLER ECHO F-UP/LMTD STD: CPT | Mod: 26

## 2017-03-23 PROCEDURE — 93308 TTE F-UP OR LMTD: CPT | Mod: 26

## 2017-03-23 RX ORDER — SODIUM POLYSTYRENE SULFONATE 4.1 MEQ/G
30 POWDER, FOR SUSPENSION ORAL ONCE
Qty: 0 | Refills: 0 | Status: COMPLETED | OUTPATIENT
Start: 2017-03-23 | End: 2017-03-23

## 2017-03-23 RX ADMIN — SIMVASTATIN 40 MILLIGRAM(S): 20 TABLET, FILM COATED ORAL at 22:15

## 2017-03-23 RX ADMIN — Medication 100 MILLIGRAM(S): at 22:15

## 2017-03-23 RX ADMIN — Medication 1334 MILLIGRAM(S): at 11:50

## 2017-03-23 RX ADMIN — LOSARTAN POTASSIUM 25 MILLIGRAM(S): 100 TABLET, FILM COATED ORAL at 06:08

## 2017-03-23 RX ADMIN — Medication 3 MILLILITER(S): at 17:07

## 2017-03-23 RX ADMIN — INSULIN GLARGINE 10 UNIT(S): 100 INJECTION, SOLUTION SUBCUTANEOUS at 22:15

## 2017-03-23 RX ADMIN — Medication 2: at 12:29

## 2017-03-23 RX ADMIN — Medication 3 UNIT(S): at 08:41

## 2017-03-23 RX ADMIN — HEPARIN SODIUM 5000 UNIT(S): 5000 INJECTION INTRAVENOUS; SUBCUTANEOUS at 06:08

## 2017-03-23 RX ADMIN — Medication 3 UNIT(S): at 12:30

## 2017-03-23 RX ADMIN — CARVEDILOL PHOSPHATE 12.5 MILLIGRAM(S): 80 CAPSULE, EXTENDED RELEASE ORAL at 06:08

## 2017-03-23 RX ADMIN — CARVEDILOL PHOSPHATE 12.5 MILLIGRAM(S): 80 CAPSULE, EXTENDED RELEASE ORAL at 17:07

## 2017-03-23 RX ADMIN — SODIUM POLYSTYRENE SULFONATE 30 GRAM(S): 4.1 POWDER, FOR SUSPENSION ORAL at 11:50

## 2017-03-23 RX ADMIN — Medication 1 TABLET(S): at 11:50

## 2017-03-23 RX ADMIN — Medication 3 MILLILITER(S): at 11:50

## 2017-03-23 RX ADMIN — Medication 1334 MILLIGRAM(S): at 17:07

## 2017-03-23 RX ADMIN — CLOPIDOGREL BISULFATE 75 MILLIGRAM(S): 75 TABLET, FILM COATED ORAL at 11:50

## 2017-03-23 RX ADMIN — Medication 3 UNIT(S): at 17:06

## 2017-03-23 RX ADMIN — Medication 3 MILLILITER(S): at 06:13

## 2017-03-23 RX ADMIN — Medication 1334 MILLIGRAM(S): at 08:41

## 2017-03-23 RX ADMIN — HEPARIN SODIUM 5000 UNIT(S): 5000 INJECTION INTRAVENOUS; SUBCUTANEOUS at 17:07

## 2017-03-23 RX ADMIN — Medication 3 MILLILITER(S): at 23:15

## 2017-03-24 VITALS — SYSTOLIC BLOOD PRESSURE: 149 MMHG | HEART RATE: 87 BPM | DIASTOLIC BLOOD PRESSURE: 63 MMHG

## 2017-03-24 LAB
ANION GAP SERPL CALC-SCNC: 12 MMOL/L — SIGNIFICANT CHANGE UP (ref 5–17)
BUN SERPL-MCNC: 44 MG/DL — HIGH (ref 7–23)
CALCIUM SERPL-MCNC: 9.1 MG/DL — SIGNIFICANT CHANGE UP (ref 8.4–10.5)
CHLORIDE SERPL-SCNC: 95 MMOL/L — LOW (ref 96–108)
CO2 SERPL-SCNC: 27 MMOL/L — SIGNIFICANT CHANGE UP (ref 22–31)
CREAT SERPL-MCNC: 4.76 MG/DL — HIGH (ref 0.5–1.3)
GLUCOSE SERPL-MCNC: 66 MG/DL — LOW (ref 70–99)
HCT VFR BLD CALC: 33.9 % — LOW (ref 34.5–45)
HGB BLD-MCNC: 10.3 G/DL — LOW (ref 11.5–15.5)
MCHC RBC-ENTMCNC: 30.4 GM/DL — LOW (ref 32–36)
MCHC RBC-ENTMCNC: 30.6 PG — SIGNIFICANT CHANGE UP (ref 27–34)
MCV RBC AUTO: 100.6 FL — HIGH (ref 80–100)
PLATELET # BLD AUTO: 187 K/UL — SIGNIFICANT CHANGE UP (ref 150–400)
POTASSIUM SERPL-MCNC: 5.2 MMOL/L — SIGNIFICANT CHANGE UP (ref 3.5–5.3)
POTASSIUM SERPL-SCNC: 5.2 MMOL/L — SIGNIFICANT CHANGE UP (ref 3.5–5.3)
RBC # BLD: 3.37 M/UL — LOW (ref 3.8–5.2)
RBC # FLD: 17.9 % — HIGH (ref 10.3–14.5)
SODIUM SERPL-SCNC: 134 MMOL/L — LOW (ref 135–145)
WBC # BLD: 4.63 K/UL — SIGNIFICANT CHANGE UP (ref 3.8–10.5)
WBC # FLD AUTO: 4.63 K/UL — SIGNIFICANT CHANGE UP (ref 3.8–10.5)

## 2017-03-24 PROCEDURE — 99232 SBSQ HOSP IP/OBS MODERATE 35: CPT

## 2017-03-24 PROCEDURE — 99233 SBSQ HOSP IP/OBS HIGH 50: CPT | Mod: GC

## 2017-03-24 RX ORDER — LOSARTAN POTASSIUM 100 MG/1
1 TABLET, FILM COATED ORAL
Qty: 0 | Refills: 0 | COMMUNITY
Start: 2017-03-24

## 2017-03-24 RX ORDER — CARVEDILOL PHOSPHATE 80 MG/1
1 CAPSULE, EXTENDED RELEASE ORAL
Qty: 60 | Refills: 0 | OUTPATIENT
Start: 2017-03-24 | End: 2017-04-23

## 2017-03-24 RX ORDER — SIMVASTATIN 20 MG/1
1 TABLET, FILM COATED ORAL
Qty: 0 | Refills: 0 | COMMUNITY
Start: 2017-03-24

## 2017-03-24 RX ORDER — CARVEDILOL PHOSPHATE 80 MG/1
1 CAPSULE, EXTENDED RELEASE ORAL
Qty: 0 | Refills: 0 | COMMUNITY
Start: 2017-03-24

## 2017-03-24 RX ORDER — CLOPIDOGREL BISULFATE 75 MG/1
1 TABLET, FILM COATED ORAL
Qty: 0 | Refills: 0 | COMMUNITY
Start: 2017-03-24

## 2017-03-24 RX ORDER — CALCIUM ACETATE 667 MG
1 TABLET ORAL
Qty: 0 | Refills: 0 | COMMUNITY
Start: 2017-03-24

## 2017-03-24 RX ORDER — CLOPIDOGREL BISULFATE 75 MG/1
1 TABLET, FILM COATED ORAL
Qty: 30 | Refills: 0 | OUTPATIENT
Start: 2017-03-24 | End: 2017-04-23

## 2017-03-24 RX ORDER — SIMVASTATIN 20 MG/1
1 TABLET, FILM COATED ORAL
Qty: 30 | Refills: 0 | OUTPATIENT
Start: 2017-03-24 | End: 2017-04-23

## 2017-03-24 RX ORDER — ACETAMINOPHEN 500 MG
650 TABLET ORAL ONCE
Qty: 0 | Refills: 0 | Status: COMPLETED | OUTPATIENT
Start: 2017-03-24 | End: 2017-03-24

## 2017-03-24 RX ORDER — METOCLOPRAMIDE HCL 10 MG
1 TABLET ORAL
Qty: 0 | Refills: 0 | COMMUNITY
Start: 2017-03-24

## 2017-03-24 RX ORDER — TRAZODONE HCL 50 MG
1 TABLET ORAL
Qty: 0 | Refills: 0 | COMMUNITY
Start: 2017-03-24

## 2017-03-24 RX ADMIN — CARVEDILOL PHOSPHATE 12.5 MILLIGRAM(S): 80 CAPSULE, EXTENDED RELEASE ORAL at 16:58

## 2017-03-24 RX ADMIN — Medication 650 MILLIGRAM(S): at 01:10

## 2017-03-24 RX ADMIN — Medication 1334 MILLIGRAM(S): at 08:35

## 2017-03-24 RX ADMIN — ERYTHROPOIETIN 10000 UNIT(S): 10000 INJECTION, SOLUTION INTRAVENOUS; SUBCUTANEOUS at 10:49

## 2017-03-24 RX ADMIN — Medication 650 MILLIGRAM(S): at 00:23

## 2017-03-24 RX ADMIN — Medication 3 MILLILITER(S): at 16:58

## 2017-03-24 RX ADMIN — CLOPIDOGREL BISULFATE 75 MILLIGRAM(S): 75 TABLET, FILM COATED ORAL at 14:23

## 2017-03-24 RX ADMIN — HEPARIN SODIUM 5000 UNIT(S): 5000 INJECTION INTRAVENOUS; SUBCUTANEOUS at 06:07

## 2017-03-24 RX ADMIN — Medication 3 MILLILITER(S): at 06:07

## 2017-03-24 RX ADMIN — LOSARTAN POTASSIUM 25 MILLIGRAM(S): 100 TABLET, FILM COATED ORAL at 14:23

## 2017-03-24 RX ADMIN — Medication 1: at 16:58

## 2017-03-24 RX ADMIN — Medication 1334 MILLIGRAM(S): at 14:23

## 2017-03-24 RX ADMIN — Medication 1 TABLET(S): at 14:23

## 2017-03-24 RX ADMIN — Medication 3 UNIT(S): at 16:58

## 2017-03-24 RX ADMIN — Medication 3 UNIT(S): at 08:35

## 2017-03-24 RX ADMIN — Medication 3 MILLILITER(S): at 14:23

## 2017-03-24 RX ADMIN — Medication 1334 MILLIGRAM(S): at 16:58

## 2017-03-24 RX ADMIN — Medication 3 UNIT(S): at 14:23

## 2017-03-24 NOTE — PROVIDER CONTACT NOTE (MEDICATION) - ACTION/TREATMENT ORDERED:
NP spoke with daughter, and she is not sure what home dose is and is requesting to F/U with PMD next week. So write on paperwork daughter will f/u with PMD regarding dose. Will continue to monitor.

## 2017-04-03 ENCOUNTER — INPATIENT (INPATIENT)
Facility: HOSPITAL | Age: 72
LOS: 2 days | Discharge: HOME | DRG: 291 | End: 2017-04-06
Attending: INTERNAL MEDICINE | Admitting: INTERNAL MEDICINE
Payer: MEDICARE

## 2017-04-03 VITALS
HEART RATE: 83 BPM | TEMPERATURE: 98 F | OXYGEN SATURATION: 100 % | DIASTOLIC BLOOD PRESSURE: 69 MMHG | SYSTOLIC BLOOD PRESSURE: 164 MMHG | RESPIRATION RATE: 16 BRPM

## 2017-04-03 DIAGNOSIS — Z90.710 ACQUIRED ABSENCE OF BOTH CERVIX AND UTERUS: Chronic | ICD-10-CM

## 2017-04-03 DIAGNOSIS — R06.02 SHORTNESS OF BREATH: ICD-10-CM

## 2017-04-03 LAB
ALBUMIN SERPL ELPH-MCNC: 3.7 G/DL — SIGNIFICANT CHANGE UP (ref 3.3–5)
ALBUMIN SERPL ELPH-MCNC: 3.8 G/DL — SIGNIFICANT CHANGE UP (ref 3.3–5)
ALP SERPL-CCNC: 140 U/L — HIGH (ref 40–120)
ALP SERPL-CCNC: 155 U/L — HIGH (ref 40–120)
ALT FLD-CCNC: 41 U/L RC — SIGNIFICANT CHANGE UP (ref 10–45)
ALT FLD-CCNC: 43 U/L RC — SIGNIFICANT CHANGE UP (ref 10–45)
ANION GAP SERPL CALC-SCNC: 17 MMOL/L — SIGNIFICANT CHANGE UP (ref 5–17)
APTT BLD: 26.1 SEC — LOW (ref 27.5–37.4)
AST SERPL-CCNC: 52 U/L — HIGH (ref 10–40)
AST SERPL-CCNC: 59 U/L — HIGH (ref 10–40)
BASE EXCESS BLDV CALC-SCNC: -3.4 MMOL/L — LOW (ref -2–2)
BILIRUB SERPL-MCNC: 0.3 MG/DL — SIGNIFICANT CHANGE UP (ref 0.2–1.2)
BILIRUB SERPL-MCNC: 0.3 MG/DL — SIGNIFICANT CHANGE UP (ref 0.2–1.2)
BLD GP AB SCN SERPL QL: NEGATIVE — SIGNIFICANT CHANGE UP
BUN SERPL-MCNC: 53 MG/DL — HIGH (ref 7–23)
BUN SERPL-MCNC: 55 MG/DL — HIGH (ref 7–23)
BUN SERPL-MCNC: 56 MG/DL — HIGH (ref 7–23)
CA-I SERPL-SCNC: 1.18 MMOL/L — SIGNIFICANT CHANGE UP (ref 1.12–1.3)
CALCIUM SERPL-MCNC: 8.9 MG/DL — SIGNIFICANT CHANGE UP (ref 8.4–10.5)
CALCIUM SERPL-MCNC: 8.9 MG/DL — SIGNIFICANT CHANGE UP (ref 8.4–10.5)
CALCIUM SERPL-MCNC: 9.2 MG/DL — SIGNIFICANT CHANGE UP (ref 8.4–10.5)
CHLORIDE BLDV-SCNC: 106 MMOL/L — SIGNIFICANT CHANGE UP (ref 96–108)
CHLORIDE SERPL-SCNC: 101 MMOL/L — SIGNIFICANT CHANGE UP (ref 96–108)
CHLORIDE SERPL-SCNC: 99 MMOL/L — SIGNIFICANT CHANGE UP (ref 96–108)
CHLORIDE SERPL-SCNC: 99 MMOL/L — SIGNIFICANT CHANGE UP (ref 96–108)
CK MB BLD-MCNC: 9.1 % — HIGH (ref 0–3.5)
CK MB CFR SERPL CALC: 4.5 NG/ML — HIGH (ref 0–3.8)
CK MB CFR SERPL CALC: 5.3 NG/ML — HIGH (ref 0–3.8)
CK SERPL-CCNC: 58 U/L — SIGNIFICANT CHANGE UP (ref 25–170)
CK SERPL-CCNC: 78 U/L — SIGNIFICANT CHANGE UP (ref 25–170)
CO2 BLDV-SCNC: 25 MMOL/L — SIGNIFICANT CHANGE UP (ref 22–30)
CO2 SERPL-SCNC: 22 MMOL/L — SIGNIFICANT CHANGE UP (ref 22–31)
CO2 SERPL-SCNC: 22 MMOL/L — SIGNIFICANT CHANGE UP (ref 22–31)
CO2 SERPL-SCNC: 23 MMOL/L — SIGNIFICANT CHANGE UP (ref 22–31)
CREAT SERPL-MCNC: 4.84 MG/DL — HIGH (ref 0.5–1.3)
CREAT SERPL-MCNC: 4.89 MG/DL — HIGH (ref 0.5–1.3)
CREAT SERPL-MCNC: 4.91 MG/DL — HIGH (ref 0.5–1.3)
GAS PNL BLDV: 134 MMOL/L — LOW (ref 136–145)
GAS PNL BLDV: SIGNIFICANT CHANGE UP
GAS PNL BLDV: SIGNIFICANT CHANGE UP
GLUCOSE BLDV-MCNC: 51 MG/DL — LOW (ref 70–99)
GLUCOSE SERPL-MCNC: 52 MG/DL — LOW (ref 70–99)
GLUCOSE SERPL-MCNC: 63 MG/DL — LOW (ref 70–99)
GLUCOSE SERPL-MCNC: 72 MG/DL — SIGNIFICANT CHANGE UP (ref 70–99)
HCO3 BLDV-SCNC: 23 MMOL/L — SIGNIFICANT CHANGE UP (ref 21–29)
HCT VFR BLD CALC: 26.1 % — LOW (ref 34.5–45)
HCT VFR BLDA CALC: 42 % — SIGNIFICANT CHANGE UP (ref 39–50)
HGB BLD CALC-MCNC: 13.6 G/DL — SIGNIFICANT CHANGE UP (ref 11.5–15.5)
HGB BLD-MCNC: 8.7 G/DL — LOW (ref 11.5–15.5)
INR BLD: 1.1 RATIO — SIGNIFICANT CHANGE UP (ref 0.88–1.16)
LACTATE BLDV-MCNC: 1.1 MMOL/L — SIGNIFICANT CHANGE UP (ref 0.7–2)
LIDOCAIN IGE QN: 27 U/L — SIGNIFICANT CHANGE UP (ref 7–60)
LYMPHOCYTES # BLD AUTO: 14 % — SIGNIFICANT CHANGE UP (ref 13–44)
MCHC RBC-ENTMCNC: 32.8 PG — SIGNIFICANT CHANGE UP (ref 27–34)
MCHC RBC-ENTMCNC: 33.4 GM/DL — SIGNIFICANT CHANGE UP (ref 32–36)
MCV RBC AUTO: 98.1 FL — SIGNIFICANT CHANGE UP (ref 80–100)
MONOCYTES NFR BLD AUTO: 11 % — SIGNIFICANT CHANGE UP (ref 2–14)
NEUTROPHILS NFR BLD AUTO: 75 % — SIGNIFICANT CHANGE UP (ref 43–77)
PCO2 BLDV: 50 MMHG — SIGNIFICANT CHANGE UP (ref 35–50)
PH BLDV: 7.29 — LOW (ref 7.35–7.45)
PLATELET # BLD AUTO: 76 K/UL — LOW (ref 150–400)
PO2 BLDV: 47 MMHG — HIGH (ref 25–45)
POTASSIUM BLDV-SCNC: 5.5 MMOL/L — HIGH (ref 3.5–5)
POTASSIUM SERPL-MCNC: 5.9 MMOL/L — HIGH (ref 3.5–5.3)
POTASSIUM SERPL-MCNC: 6.2 MMOL/L — CRITICAL HIGH (ref 3.5–5.3)
POTASSIUM SERPL-MCNC: 6.2 MMOL/L — CRITICAL HIGH (ref 3.5–5.3)
POTASSIUM SERPL-SCNC: 5.9 MMOL/L — HIGH (ref 3.5–5.3)
POTASSIUM SERPL-SCNC: 6.2 MMOL/L — CRITICAL HIGH (ref 3.5–5.3)
POTASSIUM SERPL-SCNC: 6.2 MMOL/L — CRITICAL HIGH (ref 3.5–5.3)
PROT SERPL-MCNC: 7.3 G/DL — SIGNIFICANT CHANGE UP (ref 6–8.3)
PROT SERPL-MCNC: 7.4 G/DL — SIGNIFICANT CHANGE UP (ref 6–8.3)
PROTHROM AB SERPL-ACNC: 12 SEC — SIGNIFICANT CHANGE UP (ref 9.8–12.7)
RBC # BLD: 2.66 M/UL — LOW (ref 3.8–5.2)
RBC # FLD: 16.4 % — HIGH (ref 10.3–14.5)
RH IG SCN BLD-IMP: NEGATIVE — SIGNIFICANT CHANGE UP
SAO2 % BLDV: 72 % — SIGNIFICANT CHANGE UP (ref 67–88)
SODIUM SERPL-SCNC: 138 MMOL/L — SIGNIFICANT CHANGE UP (ref 135–145)
SODIUM SERPL-SCNC: 138 MMOL/L — SIGNIFICANT CHANGE UP (ref 135–145)
SODIUM SERPL-SCNC: 141 MMOL/L — SIGNIFICANT CHANGE UP (ref 135–145)
TROPONIN T SERPL-MCNC: 0.11 NG/ML — HIGH (ref 0–0.06)
TROPONIN T SERPL-MCNC: 0.11 NG/ML — HIGH (ref 0–0.06)
WBC # BLD: 3.8 K/UL — SIGNIFICANT CHANGE UP (ref 3.8–10.5)
WBC # FLD AUTO: 3.8 K/UL — SIGNIFICANT CHANGE UP (ref 3.8–10.5)

## 2017-04-03 PROCEDURE — 99285 EMERGENCY DEPT VISIT HI MDM: CPT | Mod: 25,GC

## 2017-04-03 PROCEDURE — 74176 CT ABD & PELVIS W/O CONTRAST: CPT | Mod: 26

## 2017-04-03 PROCEDURE — 71010: CPT | Mod: 26

## 2017-04-03 PROCEDURE — 93010 ELECTROCARDIOGRAM REPORT: CPT

## 2017-04-03 RX ORDER — LOSARTAN POTASSIUM 100 MG/1
25 TABLET, FILM COATED ORAL DAILY
Qty: 0 | Refills: 0 | Status: DISCONTINUED | OUTPATIENT
Start: 2017-04-03 | End: 2017-04-06

## 2017-04-03 RX ORDER — HEPARIN SODIUM 5000 [USP'U]/ML
5000 INJECTION INTRAVENOUS; SUBCUTANEOUS EVERY 8 HOURS
Qty: 0 | Refills: 0 | Status: DISCONTINUED | OUTPATIENT
Start: 2017-04-03 | End: 2017-04-06

## 2017-04-03 RX ORDER — SUCRALFATE 1 G
1 TABLET ORAL ONCE
Qty: 0 | Refills: 0 | Status: COMPLETED | OUTPATIENT
Start: 2017-04-03 | End: 2017-04-03

## 2017-04-03 RX ORDER — ENOXAPARIN SODIUM 100 MG/ML
10 INJECTION SUBCUTANEOUS
Qty: 0 | Refills: 0 | COMMUNITY

## 2017-04-03 RX ORDER — ASPIRIN/CALCIUM CARB/MAGNESIUM 324 MG
324 TABLET ORAL ONCE
Qty: 0 | Refills: 0 | Status: COMPLETED | OUTPATIENT
Start: 2017-04-03 | End: 2017-04-03

## 2017-04-03 RX ORDER — HYDROMORPHONE HYDROCHLORIDE 2 MG/ML
0.5 INJECTION INTRAMUSCULAR; INTRAVENOUS; SUBCUTANEOUS ONCE
Qty: 0 | Refills: 0 | Status: DISCONTINUED | OUTPATIENT
Start: 2017-04-03 | End: 2017-04-03

## 2017-04-03 RX ORDER — ASPIRIN/CALCIUM CARB/MAGNESIUM 324 MG
81 TABLET ORAL DAILY
Qty: 0 | Refills: 0 | Status: DISCONTINUED | OUTPATIENT
Start: 2017-04-03 | End: 2017-04-06

## 2017-04-03 RX ORDER — ACETAMINOPHEN 500 MG
1000 TABLET ORAL ONCE
Qty: 0 | Refills: 0 | Status: COMPLETED | OUTPATIENT
Start: 2017-04-03 | End: 2017-04-03

## 2017-04-03 RX ORDER — PANTOPRAZOLE SODIUM 20 MG/1
40 TABLET, DELAYED RELEASE ORAL ONCE
Qty: 0 | Refills: 0 | Status: COMPLETED | OUTPATIENT
Start: 2017-04-03 | End: 2017-04-03

## 2017-04-03 RX ORDER — FAMOTIDINE 10 MG/ML
20 INJECTION INTRAVENOUS ONCE
Qty: 0 | Refills: 0 | Status: COMPLETED | OUTPATIENT
Start: 2017-04-03 | End: 2017-04-03

## 2017-04-03 RX ORDER — DEXTROSE 50 % IN WATER 50 %
50 SYRINGE (ML) INTRAVENOUS ONCE
Qty: 0 | Refills: 0 | Status: COMPLETED | OUTPATIENT
Start: 2017-04-03 | End: 2017-04-03

## 2017-04-03 RX ORDER — ALUMINUM HYDROXIDE
30 POWDER (GRAM) MISCELLANEOUS EVERY 8 HOURS
Qty: 0 | Refills: 0 | Status: DISCONTINUED | OUTPATIENT
Start: 2017-04-03 | End: 2017-04-06

## 2017-04-03 RX ORDER — SODIUM CHLORIDE 9 MG/ML
3 INJECTION INTRAMUSCULAR; INTRAVENOUS; SUBCUTANEOUS ONCE
Qty: 0 | Refills: 0 | Status: COMPLETED | OUTPATIENT
Start: 2017-04-03 | End: 2017-04-03

## 2017-04-03 RX ORDER — CLOPIDOGREL BISULFATE 75 MG/1
75 TABLET, FILM COATED ORAL DAILY
Qty: 0 | Refills: 0 | Status: DISCONTINUED | OUTPATIENT
Start: 2017-04-03 | End: 2017-04-06

## 2017-04-03 RX ORDER — SIMVASTATIN 20 MG/1
40 TABLET, FILM COATED ORAL AT BEDTIME
Qty: 0 | Refills: 0 | Status: DISCONTINUED | OUTPATIENT
Start: 2017-04-03 | End: 2017-04-06

## 2017-04-03 RX ORDER — PANTOPRAZOLE SODIUM 20 MG/1
40 TABLET, DELAYED RELEASE ORAL
Qty: 0 | Refills: 0 | Status: DISCONTINUED | OUTPATIENT
Start: 2017-04-03 | End: 2017-04-06

## 2017-04-03 RX ORDER — GABAPENTIN 400 MG/1
200 CAPSULE ORAL THREE TIMES A DAY
Qty: 0 | Refills: 0 | Status: DISCONTINUED | OUTPATIENT
Start: 2017-04-03 | End: 2017-04-06

## 2017-04-03 RX ADMIN — HYDROMORPHONE HYDROCHLORIDE 0.5 MILLIGRAM(S): 2 INJECTION INTRAMUSCULAR; INTRAVENOUS; SUBCUTANEOUS at 07:15

## 2017-04-03 RX ADMIN — HYDROMORPHONE HYDROCHLORIDE 0.5 MILLIGRAM(S): 2 INJECTION INTRAMUSCULAR; INTRAVENOUS; SUBCUTANEOUS at 16:00

## 2017-04-03 RX ADMIN — HYDROMORPHONE HYDROCHLORIDE 0.5 MILLIGRAM(S): 2 INJECTION INTRAMUSCULAR; INTRAVENOUS; SUBCUTANEOUS at 15:50

## 2017-04-03 RX ADMIN — SODIUM CHLORIDE 3 MILLILITER(S): 9 INJECTION INTRAMUSCULAR; INTRAVENOUS; SUBCUTANEOUS at 08:45

## 2017-04-03 RX ADMIN — Medication 324 MILLIGRAM(S): at 06:31

## 2017-04-03 RX ADMIN — Medication 1000 MILLIGRAM(S): at 10:50

## 2017-04-03 RX ADMIN — Medication 50 MILLILITER(S): at 10:50

## 2017-04-03 RX ADMIN — SIMVASTATIN 40 MILLIGRAM(S): 20 TABLET, FILM COATED ORAL at 23:01

## 2017-04-03 RX ADMIN — GABAPENTIN 200 MILLIGRAM(S): 400 CAPSULE ORAL at 23:01

## 2017-04-03 RX ADMIN — PANTOPRAZOLE SODIUM 40 MILLIGRAM(S): 20 TABLET, DELAYED RELEASE ORAL at 09:55

## 2017-04-03 RX ADMIN — Medication 1 GRAM(S): at 06:31

## 2017-04-03 RX ADMIN — FAMOTIDINE 20 MILLIGRAM(S): 10 INJECTION INTRAVENOUS at 06:31

## 2017-04-03 RX ADMIN — HYDROMORPHONE HYDROCHLORIDE 0.5 MILLIGRAM(S): 2 INJECTION INTRAMUSCULAR; INTRAVENOUS; SUBCUTANEOUS at 06:26

## 2017-04-03 RX ADMIN — Medication 400 MILLIGRAM(S): at 09:55

## 2017-04-03 RX ADMIN — HEPARIN SODIUM 5000 UNIT(S): 5000 INJECTION INTRAVENOUS; SUBCUTANEOUS at 23:01

## 2017-04-03 NOTE — ED ADULT NURSE NOTE - PMH
CAD (Coronary Artery Disease)    DM (Diabetes Mellitus)    ESRD (end stage renal disease) on dialysis    GERD (Gastroesophageal Reflux Disease)    HTN (Hypertension)    Hx of iron deficiency anemia    Hypercholesteremia    Ileostomy in place    Kidney Stones  s/p lithotripsy

## 2017-04-03 NOTE — ED PROVIDER NOTE - OBJECTIVE STATEMENT
72 y/o F w/ hx of DM, ESRD on dialysis - MWF, CAD s/p stents, p/w pressure-like sensation in epigastrium and SOB. Started 3 days ago, No cough, fever, chills, n/v/d. Had similar sensation a few months ago, went to OSH, symptoms improved w/ dialysis.   primary care physician: Percy Ann,

## 2017-04-03 NOTE — ED ADULT NURSE NOTE - PSH
AV fistula  Left AV Fistula  Bowel perforation  08/2011  Coronary Stent  x 2. LAD, Circ 2012  S/P colectomy  Ca with resection 7 yrs ago

## 2017-04-03 NOTE — ED ADULT NURSE NOTE - OBJECTIVE STATEMENT
pt c/o epigastric pain with nausea that initiated over night.  pt is for HD today; usually has dialysis q M/W/F via right AV graft  pt is awake, alert and responsive to all stimuli.  no sob or respiratory distress noted.  attempted twice to insert HL but unsuccessful.  pt given pain meds via IM route, as per md's orders.  Md will attempt to insert HL via sonogram.

## 2017-04-03 NOTE — H&P ADULT. - ASSESSMENT
70 y/o F w/ hx of DM, ESRD on dialysis - MWF, CAD s/p stents, p/w pressure-like sensation in epigastrium and SOB. Started 3 days ago, No cough, fever, chills, n/v/d. Had similar sensation a few months ago, went to OSH, symptoms improved w/ dialysis.  a/p- esrd hd per renal  volume overload-hd today  epigastric pain-cie trop q 8 x 3, cont with ppi, gi consult called, 2 d echo  cad- cw asa plavix  hld-cw zocor  dvt px - heparin 70 y/o F w/ hx of DM, ESRD on dialysis - MWF, CAD s/p stents, p/w pressure-like sensation in epigastrium and SOB. Started 3 days ago, No cough, fever, chills, n/v/d. Had similar sensation a few months ago, went to OSH, symptoms improved w/ dialysis.  a/p- esrd hd per renal  acute on chronic systolic chf, volume overload-hd today  epigastric pain-cie trop q 8 x 3, cont with ppi, gi consult called, 2 d echo done at recent hospitalization  cad- cw asa plavix  hld-cw zocor  dvt px - heparin

## 2017-04-03 NOTE — H&P ADULT. - PSH
AV fistula  Left AV Fistula  Bowel perforation  08/2011  Coronary Stent  x 2. LAD, Circ 2012  H/O abdominal hysterectomy    S/P colectomy  Ca with resection 7 yrs ago

## 2017-04-03 NOTE — H&P ADULT. - HISTORY OF PRESENT ILLNESS
72 y/o F w/ hx of DM, ESRD on dialysis - MWF, CAD s/p stents, p/w pressure-like sensation in epigastrium and SOB. Started 3 days ago, No cough, fever, chills, n/v/d. Had similar sensation a few months ago, went to OSH, symptoms improved w/ dialysis.

## 2017-04-03 NOTE — ED PROVIDER NOTE - PHYSICAL EXAMINATION
Gen: NAD  Eyes:  sclerae white, no icterus  ENT: Moist mucous membranes. No exudates  Neck: supple, no LAD, mass or goiter, trachea midline  CV: RRR. Audible S1 and S2. No murmurs, rubs, gallops, S3, nor S4  Pulm: Decr'd breath sounds, LL base  Abd: distended, TTP across upper abdomen  Musculoskeletal:  No edema  Skin: no lesions or scars noted  Psych: mood good, affect full range and congruent with mood.  Neurologic: AAOx3

## 2017-04-03 NOTE — ED PROVIDER NOTE - ATTENDING CONTRIBUTION TO CARE
Attending MD Cook:  I personally have seen and examined this patient.  Resident note reviewed and agree on plan of care and except where noted.  See HPI, PE, and MDM for details. Attending MD Cook:  I personally have seen and examined this patient.  Resident note reviewed and agree on plan of care and except where noted.  See HPI, PE, and MDM for details.       Attending MD Cook: A & O x 3, appears uncomfortable, HEENT WNL and no facial asymmetry; lungs diminished BS at bases, heart with reg rhythm without murmur; abdomen soft, ND, +ttp diffusely but maximally in epigastrium; b/l ankle edema, 2+; neuro exam non focal with no motor or sensory deficits.

## 2017-04-03 NOTE — ED PROVIDER NOTE - MEDICAL DECISION MAKING DETAILS
Attending MD Cook: 71F with ESRD on HD MWF, ileostomy, CAD presenting with severe upper abdominal pain x 1 day, diffuse upper abd ttp on exam, ddx includes pancreatitis, cholecystitis, SBO, gastritis, ACS a consideration as well will cycle Ed, CT a/p, labs re-eval

## 2017-04-04 LAB
24R-OH-CALCIDIOL SERPL-MCNC: 15.5 NG/ML — LOW (ref 30–100)
ALBUMIN SERPL ELPH-MCNC: 3.5 G/DL — SIGNIFICANT CHANGE UP (ref 3.3–5)
ALP SERPL-CCNC: 133 U/L — HIGH (ref 40–120)
ALT FLD-CCNC: 35 U/L — SIGNIFICANT CHANGE UP (ref 10–45)
AMYLASE P1 CFR SERPL: 47 U/L — SIGNIFICANT CHANGE UP (ref 25–125)
ANION GAP SERPL CALC-SCNC: 14 MMOL/L — SIGNIFICANT CHANGE UP (ref 5–17)
AST SERPL-CCNC: 37 U/L — SIGNIFICANT CHANGE UP (ref 10–40)
BILIRUB DIRECT SERPL-MCNC: 0.1 MG/DL — SIGNIFICANT CHANGE UP (ref 0–0.2)
BILIRUB INDIRECT FLD-MCNC: 0.3 MG/DL — SIGNIFICANT CHANGE UP (ref 0.2–1)
BILIRUB SERPL-MCNC: 0.4 MG/DL — SIGNIFICANT CHANGE UP (ref 0.2–1.2)
BUN SERPL-MCNC: 21 MG/DL — SIGNIFICANT CHANGE UP (ref 7–23)
CALCIUM SERPL-MCNC: 8.4 MG/DL — SIGNIFICANT CHANGE UP (ref 8.4–10.5)
CHLORIDE SERPL-SCNC: 96 MMOL/L — SIGNIFICANT CHANGE UP (ref 96–108)
CHOLEST SERPL-MCNC: 100 MG/DL — SIGNIFICANT CHANGE UP (ref 10–199)
CO2 SERPL-SCNC: 27 MMOL/L — SIGNIFICANT CHANGE UP (ref 22–31)
CREAT SERPL-MCNC: 3.01 MG/DL — HIGH (ref 0.5–1.3)
FERRITIN SERPL-MCNC: 1624 NG/ML — HIGH (ref 15–150)
FERRITIN SERPL-MCNC: 1834 NG/ML — HIGH (ref 15–150)
FOLATE SERPL-MCNC: >20 NG/ML — SIGNIFICANT CHANGE UP (ref 4.8–24.2)
FOLATE SERPL-MCNC: >20 NG/ML — SIGNIFICANT CHANGE UP (ref 4.8–24.2)
GLUCOSE SERPL-MCNC: 54 MG/DL — LOW (ref 70–99)
HBA1C BLD-MCNC: 7.2 % — HIGH (ref 4–5.6)
HCT VFR BLD CALC: 30.2 % — LOW (ref 34.5–45)
HDLC SERPL-MCNC: 50 MG/DL — SIGNIFICANT CHANGE UP (ref 40–125)
HGB BLD-MCNC: 9.4 G/DL — LOW (ref 11.5–15.5)
IRON SATN MFR SERPL: 25 % — SIGNIFICANT CHANGE UP (ref 14–50)
IRON SATN MFR SERPL: 29 % — SIGNIFICANT CHANGE UP (ref 14–50)
IRON SATN MFR SERPL: 41 UG/DL — SIGNIFICANT CHANGE UP (ref 30–160)
IRON SATN MFR SERPL: 51 UG/DL — SIGNIFICANT CHANGE UP (ref 30–160)
LIDOCAIN IGE QN: 18 U/L — SIGNIFICANT CHANGE UP (ref 7–60)
LIPID PNL WITH DIRECT LDL SERPL: 39 MG/DL — SIGNIFICANT CHANGE UP
MAGNESIUM SERPL-MCNC: 2 MG/DL — SIGNIFICANT CHANGE UP (ref 1.6–2.6)
MCHC RBC-ENTMCNC: 30.1 PG — SIGNIFICANT CHANGE UP (ref 27–34)
MCHC RBC-ENTMCNC: 31.1 GM/DL — LOW (ref 32–36)
MCV RBC AUTO: 96.8 FL — SIGNIFICANT CHANGE UP (ref 80–100)
NT-PROBNP SERPL-SCNC: HIGH PG/ML (ref 0–300)
OB PNL STL: NEGATIVE — SIGNIFICANT CHANGE UP
PHOSPHATE SERPL-MCNC: 4.1 MG/DL — SIGNIFICANT CHANGE UP (ref 2.5–4.5)
PLATELET # BLD AUTO: 201 K/UL — SIGNIFICANT CHANGE UP (ref 150–400)
POTASSIUM SERPL-MCNC: 4.7 MMOL/L — SIGNIFICANT CHANGE UP (ref 3.5–5.3)
POTASSIUM SERPL-SCNC: 4.7 MMOL/L — SIGNIFICANT CHANGE UP (ref 3.5–5.3)
PROT SERPL-MCNC: 7.2 G/DL — SIGNIFICANT CHANGE UP (ref 6–8.3)
RBC # BLD: 3.12 M/UL — LOW (ref 3.8–5.2)
RBC # FLD: 17.1 % — HIGH (ref 10.3–14.5)
SODIUM SERPL-SCNC: 137 MMOL/L — SIGNIFICANT CHANGE UP (ref 135–145)
TIBC SERPL-MCNC: 161 UG/DL — LOW (ref 220–430)
TIBC SERPL-MCNC: 176 UG/DL — LOW (ref 220–430)
TOTAL CHOLESTEROL/HDL RATIO MEASUREMENT: 2 RATIO — LOW (ref 3.3–7.1)
TRIGL SERPL-MCNC: 56 MG/DL — SIGNIFICANT CHANGE UP (ref 10–149)
TSH SERPL-MCNC: 2.61 UIU/ML — SIGNIFICANT CHANGE UP (ref 0.27–4.2)
UIBC SERPL-MCNC: 120 UG/DL — SIGNIFICANT CHANGE UP (ref 110–370)
UIBC SERPL-MCNC: 125 UG/DL — SIGNIFICANT CHANGE UP (ref 110–370)
VIT B12 SERPL-MCNC: 1311 PG/ML — HIGH (ref 243–894)
VIT B12 SERPL-MCNC: 1323 PG/ML — HIGH (ref 243–894)
WBC # BLD: 4.56 K/UL — SIGNIFICANT CHANGE UP (ref 3.8–10.5)
WBC # FLD AUTO: 4.56 K/UL — SIGNIFICANT CHANGE UP (ref 3.8–10.5)

## 2017-04-04 RX ORDER — ACETAMINOPHEN 500 MG
650 TABLET ORAL ONCE
Qty: 0 | Refills: 0 | Status: COMPLETED | OUTPATIENT
Start: 2017-04-04 | End: 2017-04-04

## 2017-04-04 RX ORDER — INSULIN LISPRO 100/ML
VIAL (ML) SUBCUTANEOUS AT BEDTIME
Qty: 0 | Refills: 0 | Status: DISCONTINUED | OUTPATIENT
Start: 2017-04-04 | End: 2017-04-06

## 2017-04-04 RX ORDER — DEXTROSE 50 % IN WATER 50 %
1 SYRINGE (ML) INTRAVENOUS ONCE
Qty: 0 | Refills: 0 | Status: DISCONTINUED | OUTPATIENT
Start: 2017-04-04 | End: 2017-04-06

## 2017-04-04 RX ORDER — SODIUM CHLORIDE 9 MG/ML
1000 INJECTION, SOLUTION INTRAVENOUS
Qty: 0 | Refills: 0 | Status: DISCONTINUED | OUTPATIENT
Start: 2017-04-04 | End: 2017-04-06

## 2017-04-04 RX ORDER — HYDROMORPHONE HYDROCHLORIDE 2 MG/ML
0.5 INJECTION INTRAMUSCULAR; INTRAVENOUS; SUBCUTANEOUS ONCE
Qty: 0 | Refills: 0 | Status: DISCONTINUED | OUTPATIENT
Start: 2017-04-04 | End: 2017-04-04

## 2017-04-04 RX ORDER — CARVEDILOL PHOSPHATE 80 MG/1
3.12 CAPSULE, EXTENDED RELEASE ORAL EVERY 12 HOURS
Qty: 0 | Refills: 0 | Status: DISCONTINUED | OUTPATIENT
Start: 2017-04-04 | End: 2017-04-05

## 2017-04-04 RX ORDER — DEXTROSE 50 % IN WATER 50 %
25 SYRINGE (ML) INTRAVENOUS ONCE
Qty: 0 | Refills: 0 | Status: DISCONTINUED | OUTPATIENT
Start: 2017-04-04 | End: 2017-04-06

## 2017-04-04 RX ORDER — INSULIN LISPRO 100/ML
VIAL (ML) SUBCUTANEOUS
Qty: 0 | Refills: 0 | Status: DISCONTINUED | OUTPATIENT
Start: 2017-04-04 | End: 2017-04-06

## 2017-04-04 RX ORDER — DEXTROSE 50 % IN WATER 50 %
50 SYRINGE (ML) INTRAVENOUS ONCE
Qty: 0 | Refills: 0 | Status: COMPLETED | OUTPATIENT
Start: 2017-04-04 | End: 2017-04-04

## 2017-04-04 RX ORDER — GLUCAGON INJECTION, SOLUTION 0.5 MG/.1ML
1 INJECTION, SOLUTION SUBCUTANEOUS ONCE
Qty: 0 | Refills: 0 | Status: DISCONTINUED | OUTPATIENT
Start: 2017-04-04 | End: 2017-04-06

## 2017-04-04 RX ORDER — DEXTROSE 50 % IN WATER 50 %
12.5 SYRINGE (ML) INTRAVENOUS ONCE
Qty: 0 | Refills: 0 | Status: DISCONTINUED | OUTPATIENT
Start: 2017-04-04 | End: 2017-04-06

## 2017-04-04 RX ADMIN — HEPARIN SODIUM 5000 UNIT(S): 5000 INJECTION INTRAVENOUS; SUBCUTANEOUS at 05:30

## 2017-04-04 RX ADMIN — LOSARTAN POTASSIUM 25 MILLIGRAM(S): 100 TABLET, FILM COATED ORAL at 05:30

## 2017-04-04 RX ADMIN — Medication 650 MILLIGRAM(S): at 14:59

## 2017-04-04 RX ADMIN — Medication 650 MILLIGRAM(S): at 22:42

## 2017-04-04 RX ADMIN — Medication 81 MILLIGRAM(S): at 11:23

## 2017-04-04 RX ADMIN — HYDROMORPHONE HYDROCHLORIDE 0.5 MILLIGRAM(S): 2 INJECTION INTRAMUSCULAR; INTRAVENOUS; SUBCUTANEOUS at 23:16

## 2017-04-04 RX ADMIN — HEPARIN SODIUM 5000 UNIT(S): 5000 INJECTION INTRAVENOUS; SUBCUTANEOUS at 13:32

## 2017-04-04 RX ADMIN — SIMVASTATIN 40 MILLIGRAM(S): 20 TABLET, FILM COATED ORAL at 22:11

## 2017-04-04 RX ADMIN — HEPARIN SODIUM 5000 UNIT(S): 5000 INJECTION INTRAVENOUS; SUBCUTANEOUS at 22:11

## 2017-04-04 RX ADMIN — CLOPIDOGREL BISULFATE 75 MILLIGRAM(S): 75 TABLET, FILM COATED ORAL at 11:23

## 2017-04-04 RX ADMIN — HYDROMORPHONE HYDROCHLORIDE 0.5 MILLIGRAM(S): 2 INJECTION INTRAMUSCULAR; INTRAVENOUS; SUBCUTANEOUS at 05:03

## 2017-04-04 RX ADMIN — PANTOPRAZOLE SODIUM 40 MILLIGRAM(S): 20 TABLET, DELAYED RELEASE ORAL at 06:07

## 2017-04-04 RX ADMIN — Medication 50 MILLILITER(S): at 08:37

## 2017-04-04 RX ADMIN — Medication 1 TABLET(S): at 13:32

## 2017-04-04 RX ADMIN — GABAPENTIN 200 MILLIGRAM(S): 400 CAPSULE ORAL at 05:30

## 2017-04-04 RX ADMIN — GABAPENTIN 200 MILLIGRAM(S): 400 CAPSULE ORAL at 13:32

## 2017-04-04 RX ADMIN — HYDROMORPHONE HYDROCHLORIDE 0.5 MILLIGRAM(S): 2 INJECTION INTRAMUSCULAR; INTRAVENOUS; SUBCUTANEOUS at 23:30

## 2017-04-04 RX ADMIN — CARVEDILOL PHOSPHATE 3.12 MILLIGRAM(S): 80 CAPSULE, EXTENDED RELEASE ORAL at 17:07

## 2017-04-04 RX ADMIN — HYDROMORPHONE HYDROCHLORIDE 0.5 MILLIGRAM(S): 2 INJECTION INTRAMUSCULAR; INTRAVENOUS; SUBCUTANEOUS at 04:48

## 2017-04-04 RX ADMIN — Medication 650 MILLIGRAM(S): at 22:12

## 2017-04-04 RX ADMIN — Medication 650 MILLIGRAM(S): at 14:29

## 2017-04-04 RX ADMIN — SODIUM CHLORIDE 50 MILLILITER(S): 9 INJECTION, SOLUTION INTRAVENOUS at 09:15

## 2017-04-04 NOTE — PROVIDER CONTACT NOTE (OTHER) - ASSESSMENT
Pt moaning, restless and complaining of abd pain. Pt will point to area of pain but has previously denied pain during episodes of restlessness.

## 2017-04-05 LAB
ANION GAP SERPL CALC-SCNC: 14 MMOL/L — SIGNIFICANT CHANGE UP (ref 5–17)
BUN SERPL-MCNC: 31 MG/DL — HIGH (ref 7–23)
CALCIUM SERPL-MCNC: 8.1 MG/DL — LOW (ref 8.4–10.5)
CHLORIDE SERPL-SCNC: 95 MMOL/L — LOW (ref 96–108)
CO2 SERPL-SCNC: 25 MMOL/L — SIGNIFICANT CHANGE UP (ref 22–31)
CREAT SERPL-MCNC: 3.86 MG/DL — HIGH (ref 0.5–1.3)
GLUCOSE SERPL-MCNC: 110 MG/DL — HIGH (ref 70–99)
HCT VFR BLD CALC: 26.8 % — LOW (ref 34.5–45)
HGB BLD-MCNC: 8.1 G/DL — LOW (ref 11.5–15.5)
MAGNESIUM SERPL-MCNC: 2.1 MG/DL — SIGNIFICANT CHANGE UP (ref 1.6–2.6)
MCHC RBC-ENTMCNC: 29.8 PG — SIGNIFICANT CHANGE UP (ref 27–34)
MCHC RBC-ENTMCNC: 30.2 GM/DL — LOW (ref 32–36)
MCV RBC AUTO: 98.5 FL — SIGNIFICANT CHANGE UP (ref 80–100)
PHOSPHATE SERPL-MCNC: 4.5 MG/DL — SIGNIFICANT CHANGE UP (ref 2.5–4.5)
PLATELET # BLD AUTO: 160 K/UL — SIGNIFICANT CHANGE UP (ref 150–400)
POTASSIUM SERPL-MCNC: 5.1 MMOL/L — SIGNIFICANT CHANGE UP (ref 3.5–5.3)
POTASSIUM SERPL-SCNC: 5.1 MMOL/L — SIGNIFICANT CHANGE UP (ref 3.5–5.3)
RBC # BLD: 2.72 M/UL — LOW (ref 3.8–5.2)
RBC # FLD: 17.1 % — HIGH (ref 10.3–14.5)
SODIUM SERPL-SCNC: 134 MMOL/L — LOW (ref 135–145)
WBC # BLD: 2.85 K/UL — LOW (ref 3.8–10.5)
WBC # FLD AUTO: 2.85 K/UL — LOW (ref 3.8–10.5)

## 2017-04-05 RX ORDER — ACETAMINOPHEN 500 MG
650 TABLET ORAL ONCE
Qty: 0 | Refills: 0 | Status: COMPLETED | OUTPATIENT
Start: 2017-04-05 | End: 2017-04-05

## 2017-04-05 RX ORDER — TRAZODONE HCL 50 MG
50 TABLET ORAL ONCE
Qty: 0 | Refills: 0 | Status: COMPLETED | OUTPATIENT
Start: 2017-04-05 | End: 2017-04-05

## 2017-04-05 RX ORDER — CARVEDILOL PHOSPHATE 80 MG/1
6.25 CAPSULE, EXTENDED RELEASE ORAL EVERY 12 HOURS
Qty: 0 | Refills: 0 | Status: DISCONTINUED | OUTPATIENT
Start: 2017-04-05 | End: 2017-04-06

## 2017-04-05 RX ADMIN — Medication 2: at 14:07

## 2017-04-05 RX ADMIN — CARVEDILOL PHOSPHATE 3.12 MILLIGRAM(S): 80 CAPSULE, EXTENDED RELEASE ORAL at 05:38

## 2017-04-05 RX ADMIN — Medication 650 MILLIGRAM(S): at 21:49

## 2017-04-05 RX ADMIN — Medication 81 MILLIGRAM(S): at 13:27

## 2017-04-05 RX ADMIN — CARVEDILOL PHOSPHATE 6.25 MILLIGRAM(S): 80 CAPSULE, EXTENDED RELEASE ORAL at 17:42

## 2017-04-05 RX ADMIN — Medication 1 TABLET(S): at 13:27

## 2017-04-05 RX ADMIN — Medication 650 MILLIGRAM(S): at 21:14

## 2017-04-05 RX ADMIN — HEPARIN SODIUM 5000 UNIT(S): 5000 INJECTION INTRAVENOUS; SUBCUTANEOUS at 22:06

## 2017-04-05 RX ADMIN — Medication 1: at 17:42

## 2017-04-05 RX ADMIN — GABAPENTIN 200 MILLIGRAM(S): 400 CAPSULE ORAL at 13:27

## 2017-04-05 RX ADMIN — LOSARTAN POTASSIUM 25 MILLIGRAM(S): 100 TABLET, FILM COATED ORAL at 05:38

## 2017-04-05 RX ADMIN — CLOPIDOGREL BISULFATE 75 MILLIGRAM(S): 75 TABLET, FILM COATED ORAL at 13:28

## 2017-04-05 RX ADMIN — HEPARIN SODIUM 5000 UNIT(S): 5000 INJECTION INTRAVENOUS; SUBCUTANEOUS at 05:38

## 2017-04-05 RX ADMIN — PANTOPRAZOLE SODIUM 40 MILLIGRAM(S): 20 TABLET, DELAYED RELEASE ORAL at 06:43

## 2017-04-05 RX ADMIN — Medication 50 MILLIGRAM(S): at 23:53

## 2017-04-05 RX ADMIN — HEPARIN SODIUM 5000 UNIT(S): 5000 INJECTION INTRAVENOUS; SUBCUTANEOUS at 13:29

## 2017-04-05 RX ADMIN — SIMVASTATIN 40 MILLIGRAM(S): 20 TABLET, FILM COATED ORAL at 22:06

## 2017-04-05 RX ADMIN — GABAPENTIN 200 MILLIGRAM(S): 400 CAPSULE ORAL at 22:06

## 2017-04-05 NOTE — PHYSICAL THERAPY INITIAL EVALUATION ADULT - PERTINENT HX OF CURRENT PROBLEM, REHAB EVAL
70 y/o F w/ hx of DM, ESRD on dialysis - MWF, CAD s/p stents, p/w pressure-like sensation in epigastrium and SOB. Started 3 days ago, No cough, fever, chills, n/v/d. Had similar sensation a few months ago, went to OSH, symptoms improved w/ dialysis.(cont below)

## 2017-04-05 NOTE — PHYSICAL THERAPY INITIAL EVALUATION ADULT - ADDITIONAL COMMENTS
(cont from above):CTAbdomen/Pelvis:Moderate left pleural effusion with overlying airspace consolidation due   to passive atelectasis or pneumonia, bibasilar interlobular septal thickening/interstitial edema and moderate cardiomegaly likely reflects changes from CHF. Dedicated imaging of the chest is recommended.Mild inflammatory stranding surrounding the gastric antrum likely reflects gastritis. Correlate with lipase to exclude pancreatitis.CXR:Mild pulmonary edema.Left lower lobe atelectasis and or pneumonia. Bilateral effusion. (cont from above):CTAbdomen/Pelvis:Moderate left pleural effusion with overlying airspace consolidation due   to passive atelectasis or pneumonia, bibasilar interlobular septal thickening/interstitial edema and moderate cardiomegaly likely reflects changes from CHF. Dedicated imaging of the chest is recommended.Mild inflammatory stranding surrounding the gastric antrum likely reflects gastritis. Correlate with lipase to exclude pancreatitis.CXR:Mild pulmonary edema.Left lower lobe atelectasis and or pneumonia. Bilateral effusion.    Pt lives in a private home with 3 steps to enter and 7 steps inside home. Pt lives with  and states functionally independent prior to admission. Pt owns RW.

## 2017-04-06 ENCOUNTER — TRANSCRIPTION ENCOUNTER (OUTPATIENT)
Age: 72
End: 2017-04-06

## 2017-04-06 VITALS
RESPIRATION RATE: 18 BRPM | OXYGEN SATURATION: 100 % | HEART RATE: 71 BPM | TEMPERATURE: 98 F | SYSTOLIC BLOOD PRESSURE: 152 MMHG | DIASTOLIC BLOOD PRESSURE: 86 MMHG

## 2017-04-06 LAB
ANION GAP SERPL CALC-SCNC: 14 MMOL/L — SIGNIFICANT CHANGE UP (ref 5–17)
BASOPHILS # BLD AUTO: 0 K/UL — SIGNIFICANT CHANGE UP (ref 0–0.2)
BASOPHILS NFR BLD AUTO: 0 % — SIGNIFICANT CHANGE UP (ref 0–2)
BUN SERPL-MCNC: 19 MG/DL — SIGNIFICANT CHANGE UP (ref 7–23)
CALCIUM SERPL-MCNC: 8.3 MG/DL — LOW (ref 8.4–10.5)
CHLORIDE SERPL-SCNC: 95 MMOL/L — LOW (ref 96–108)
CK MB CFR SERPL CALC: 4 NG/ML — HIGH (ref 0–3.8)
CK MB CFR SERPL CALC: 4.3 NG/ML — HIGH (ref 0–3.8)
CK SERPL-CCNC: 41 U/L — SIGNIFICANT CHANGE UP (ref 25–170)
CK SERPL-CCNC: 48 U/L — SIGNIFICANT CHANGE UP (ref 25–170)
CO2 SERPL-SCNC: 25 MMOL/L — SIGNIFICANT CHANGE UP (ref 22–31)
CREAT SERPL-MCNC: 3.07 MG/DL — HIGH (ref 0.5–1.3)
EOSINOPHIL # BLD AUTO: 0 K/UL — SIGNIFICANT CHANGE UP (ref 0–0.5)
EOSINOPHIL NFR BLD AUTO: 0 % — SIGNIFICANT CHANGE UP (ref 0–6)
GLUCOSE SERPL-MCNC: 164 MG/DL — HIGH (ref 70–99)
HCT VFR BLD CALC: 29.1 % — LOW (ref 34.5–45)
HGB BLD-MCNC: 8.9 G/DL — LOW (ref 11.5–15.5)
IMM GRANULOCYTES NFR BLD AUTO: 0.3 % — SIGNIFICANT CHANGE UP (ref 0–1.5)
LYMPHOCYTES # BLD AUTO: 0.49 K/UL — LOW (ref 1–3.3)
LYMPHOCYTES # BLD AUTO: 13.9 % — SIGNIFICANT CHANGE UP (ref 13–44)
MCHC RBC-ENTMCNC: 30.3 PG — SIGNIFICANT CHANGE UP (ref 27–34)
MCHC RBC-ENTMCNC: 30.6 GM/DL — LOW (ref 32–36)
MCV RBC AUTO: 99 FL — SIGNIFICANT CHANGE UP (ref 80–100)
MONOCYTES # BLD AUTO: 0.4 K/UL — SIGNIFICANT CHANGE UP (ref 0–0.9)
MONOCYTES NFR BLD AUTO: 11.3 % — SIGNIFICANT CHANGE UP (ref 2–14)
NEUTROPHILS # BLD AUTO: 2.63 K/UL — SIGNIFICANT CHANGE UP (ref 1.8–7.4)
NEUTROPHILS NFR BLD AUTO: 74.5 % — SIGNIFICANT CHANGE UP (ref 43–77)
PLATELET # BLD AUTO: 176 K/UL — SIGNIFICANT CHANGE UP (ref 150–400)
POTASSIUM SERPL-MCNC: 4.7 MMOL/L — SIGNIFICANT CHANGE UP (ref 3.5–5.3)
POTASSIUM SERPL-SCNC: 4.7 MMOL/L — SIGNIFICANT CHANGE UP (ref 3.5–5.3)
RBC # BLD: 2.94 M/UL — LOW (ref 3.8–5.2)
RBC # FLD: 16.5 % — HIGH (ref 10.3–14.5)
SODIUM SERPL-SCNC: 134 MMOL/L — LOW (ref 135–145)
TROPONIN T SERPL-MCNC: 0.11 NG/ML — HIGH (ref 0–0.06)
TROPONIN T SERPL-MCNC: 0.12 NG/ML — HIGH (ref 0–0.06)
WBC # BLD: 3.53 K/UL — LOW (ref 3.8–10.5)
WBC # FLD AUTO: 3.53 K/UL — LOW (ref 3.8–10.5)

## 2017-04-06 PROCEDURE — 82272 OCCULT BLD FECES 1-3 TESTS: CPT

## 2017-04-06 PROCEDURE — 99261: CPT

## 2017-04-06 PROCEDURE — 85027 COMPLETE CBC AUTOMATED: CPT

## 2017-04-06 PROCEDURE — 93010 ELECTROCARDIOGRAM REPORT: CPT

## 2017-04-06 PROCEDURE — 82150 ASSAY OF AMYLASE: CPT

## 2017-04-06 PROCEDURE — 82306 VITAMIN D 25 HYDROXY: CPT

## 2017-04-06 PROCEDURE — 82553 CREATINE MB FRACTION: CPT

## 2017-04-06 PROCEDURE — 86901 BLOOD TYPING SEROLOGIC RH(D): CPT

## 2017-04-06 PROCEDURE — 82565 ASSAY OF CREATININE: CPT

## 2017-04-06 PROCEDURE — 82746 ASSAY OF FOLIC ACID SERUM: CPT

## 2017-04-06 PROCEDURE — 82728 ASSAY OF FERRITIN: CPT

## 2017-04-06 PROCEDURE — 86850 RBC ANTIBODY SCREEN: CPT

## 2017-04-06 PROCEDURE — 80076 HEPATIC FUNCTION PANEL: CPT

## 2017-04-06 PROCEDURE — 83880 ASSAY OF NATRIURETIC PEPTIDE: CPT

## 2017-04-06 PROCEDURE — 80053 COMPREHEN METABOLIC PANEL: CPT

## 2017-04-06 PROCEDURE — 36000 PLACE NEEDLE IN VEIN: CPT | Mod: LT,XU

## 2017-04-06 PROCEDURE — 83036 HEMOGLOBIN GLYCOSYLATED A1C: CPT

## 2017-04-06 PROCEDURE — 82607 VITAMIN B-12: CPT

## 2017-04-06 PROCEDURE — 83690 ASSAY OF LIPASE: CPT

## 2017-04-06 PROCEDURE — 83550 IRON BINDING TEST: CPT

## 2017-04-06 PROCEDURE — 82803 BLOOD GASES ANY COMBINATION: CPT

## 2017-04-06 PROCEDURE — 97161 PT EVAL LOW COMPLEX 20 MIN: CPT

## 2017-04-06 PROCEDURE — 74176 CT ABD & PELVIS W/O CONTRAST: CPT

## 2017-04-06 PROCEDURE — 82947 ASSAY GLUCOSE BLOOD QUANT: CPT

## 2017-04-06 PROCEDURE — 93005 ELECTROCARDIOGRAM TRACING: CPT

## 2017-04-06 PROCEDURE — 84484 ASSAY OF TROPONIN QUANT: CPT

## 2017-04-06 PROCEDURE — 82550 ASSAY OF CK (CPK): CPT

## 2017-04-06 PROCEDURE — 84132 ASSAY OF SERUM POTASSIUM: CPT

## 2017-04-06 PROCEDURE — 80048 BASIC METABOLIC PNL TOTAL CA: CPT

## 2017-04-06 PROCEDURE — 82330 ASSAY OF CALCIUM: CPT

## 2017-04-06 PROCEDURE — 86900 BLOOD TYPING SEROLOGIC ABO: CPT

## 2017-04-06 PROCEDURE — 85610 PROTHROMBIN TIME: CPT

## 2017-04-06 PROCEDURE — 85730 THROMBOPLASTIN TIME PARTIAL: CPT

## 2017-04-06 PROCEDURE — 85014 HEMATOCRIT: CPT

## 2017-04-06 PROCEDURE — 96375 TX/PRO/DX INJ NEW DRUG ADDON: CPT

## 2017-04-06 PROCEDURE — 83735 ASSAY OF MAGNESIUM: CPT

## 2017-04-06 PROCEDURE — 96374 THER/PROPH/DIAG INJ IV PUSH: CPT

## 2017-04-06 PROCEDURE — 84100 ASSAY OF PHOSPHORUS: CPT

## 2017-04-06 PROCEDURE — 80061 LIPID PANEL: CPT

## 2017-04-06 PROCEDURE — 86922 COMPATIBILITY TEST ANTIGLOB: CPT

## 2017-04-06 PROCEDURE — 84295 ASSAY OF SERUM SODIUM: CPT

## 2017-04-06 PROCEDURE — 96372 THER/PROPH/DIAG INJ SC/IM: CPT | Mod: XU

## 2017-04-06 PROCEDURE — 83605 ASSAY OF LACTIC ACID: CPT

## 2017-04-06 PROCEDURE — 99285 EMERGENCY DEPT VISIT HI MDM: CPT | Mod: 25

## 2017-04-06 PROCEDURE — 71045 X-RAY EXAM CHEST 1 VIEW: CPT

## 2017-04-06 PROCEDURE — 82435 ASSAY OF BLOOD CHLORIDE: CPT

## 2017-04-06 PROCEDURE — 84443 ASSAY THYROID STIM HORMONE: CPT

## 2017-04-06 RX ORDER — LOSARTAN POTASSIUM 100 MG/1
1 TABLET, FILM COATED ORAL
Qty: 30 | Refills: 0 | OUTPATIENT
Start: 2017-04-06 | End: 2017-05-06

## 2017-04-06 RX ORDER — GABAPENTIN 400 MG/1
2 CAPSULE ORAL
Qty: 180 | Refills: 0 | OUTPATIENT
Start: 2017-04-06 | End: 2017-05-06

## 2017-04-06 RX ORDER — CARVEDILOL PHOSPHATE 80 MG/1
1 CAPSULE, EXTENDED RELEASE ORAL
Qty: 0 | Refills: 0 | COMMUNITY

## 2017-04-06 RX ORDER — MORPHINE SULFATE 50 MG/1
1 CAPSULE, EXTENDED RELEASE ORAL ONCE
Qty: 0 | Refills: 0 | Status: DISCONTINUED | OUTPATIENT
Start: 2017-04-06 | End: 2017-04-06

## 2017-04-06 RX ORDER — FUROSEMIDE 40 MG
1 TABLET ORAL
Qty: 0 | Refills: 0 | COMMUNITY

## 2017-04-06 RX ORDER — ISOSORBIDE DINITRATE 5 MG/1
1 TABLET ORAL
Qty: 60 | Refills: 0 | OUTPATIENT
Start: 2017-04-06 | End: 2017-05-06

## 2017-04-06 RX ORDER — CARVEDILOL PHOSPHATE 80 MG/1
1 CAPSULE, EXTENDED RELEASE ORAL
Qty: 60 | Refills: 0 | OUTPATIENT
Start: 2017-04-06 | End: 2017-05-06

## 2017-04-06 RX ORDER — PANTOPRAZOLE SODIUM 20 MG/1
1 TABLET, DELAYED RELEASE ORAL
Qty: 30 | Refills: 0 | OUTPATIENT
Start: 2017-04-06 | End: 2017-05-06

## 2017-04-06 RX ORDER — CALCIUM ACETATE 667 MG
2 TABLET ORAL
Qty: 0 | Refills: 0 | COMMUNITY

## 2017-04-06 RX ORDER — METOCLOPRAMIDE HCL 10 MG
1 TABLET ORAL
Qty: 0 | Refills: 0 | COMMUNITY

## 2017-04-06 RX ADMIN — GABAPENTIN 200 MILLIGRAM(S): 400 CAPSULE ORAL at 14:12

## 2017-04-06 RX ADMIN — MORPHINE SULFATE 1 MILLIGRAM(S): 50 CAPSULE, EXTENDED RELEASE ORAL at 02:55

## 2017-04-06 RX ADMIN — Medication 2: at 12:44

## 2017-04-06 RX ADMIN — HEPARIN SODIUM 5000 UNIT(S): 5000 INJECTION INTRAVENOUS; SUBCUTANEOUS at 05:54

## 2017-04-06 RX ADMIN — GABAPENTIN 200 MILLIGRAM(S): 400 CAPSULE ORAL at 05:53

## 2017-04-06 RX ADMIN — Medication 75 MILLIGRAM(S): at 00:09

## 2017-04-06 RX ADMIN — MORPHINE SULFATE 1 MILLIGRAM(S): 50 CAPSULE, EXTENDED RELEASE ORAL at 03:10

## 2017-04-06 RX ADMIN — CARVEDILOL PHOSPHATE 6.25 MILLIGRAM(S): 80 CAPSULE, EXTENDED RELEASE ORAL at 05:53

## 2017-04-06 RX ADMIN — LOSARTAN POTASSIUM 25 MILLIGRAM(S): 100 TABLET, FILM COATED ORAL at 05:53

## 2017-04-06 RX ADMIN — HEPARIN SODIUM 5000 UNIT(S): 5000 INJECTION INTRAVENOUS; SUBCUTANEOUS at 14:13

## 2017-04-06 RX ADMIN — PANTOPRAZOLE SODIUM 40 MILLIGRAM(S): 20 TABLET, DELAYED RELEASE ORAL at 05:53

## 2017-04-06 RX ADMIN — CLOPIDOGREL BISULFATE 75 MILLIGRAM(S): 75 TABLET, FILM COATED ORAL at 12:20

## 2017-04-06 RX ADMIN — Medication 81 MILLIGRAM(S): at 12:20

## 2017-04-06 RX ADMIN — Medication 1 TABLET(S): at 12:20

## 2017-04-06 NOTE — DISCHARGE NOTE ADULT - MEDICATION SUMMARY - MEDICATIONS TO STOP TAKING
I will STOP taking the medications listed below when I get home from the hospital:    Lyrica 75 mg oral capsule  -- 1 cap(s) by mouth once a day, As Needed  -- [facility] with [facility] [phone]    calcium acetate 667 mg oral capsule  -- 2 cap(s) by mouth 3 times a day  -- [facility] with [facility] [phone]    Reglan 10 mg oral tablet  -- 1 tab(s) by mouth 3 times a day, As Needed  -- [facility] with [facility] [phone]    Lasix 80 mg oral tablet  -- 1 tab(s) by mouth once a day

## 2017-04-06 NOTE — DISCHARGE NOTE ADULT - MEDICATION SUMMARY - MEDICATIONS TO CHANGE
I will SWITCH the dose or number of times a day I take the medications listed below when I get home from the hospital:    Coreg 12.5 mg oral tablet  -- 1 tab(s) by mouth 2 times a day  -- [facility] with [facility] [phone]

## 2017-04-06 NOTE — DISCHARGE NOTE ADULT - HOSPITAL COURSE
by attending 72 y/o F w/ hx of DM, ESRD on dialysis - MWF, CAD s/p stents, p/w pressure-like sensation in epigastrium and SOB. Started 3 days ago, No cough, fever, chills, n/v/d. Had similar sensation a few months ago, went to OSH, symptoms improved w/ dialysis.  Admit  Dx Epigastric  pain  3/3- CT abd gastritis  4/5 no pain d/c planning. PT eval for JOHANNY  4/6: C/o cp cardiac enyzmes no significant change from baseline EKg unchanged from 4/3. Responded to amphogel and morphine  4/6 cardiology to eval- no further chest pain. if cleared by cardiology Dr Robles then d/c home with outpatient  PT ( script in front of chart)

## 2017-04-06 NOTE — DISCHARGE NOTE ADULT - PLAN OF CARE
no pain Protonix as ordered  Call primary care physician and gastroenterologists for follow up appointment in 1 week  return to emergency if pain worsens, dizziness, chest pain, intractable vomiting, difficulty breathing, blood no exacerbation weight to be done daily call primary care physician and cardiologist if weight gain more then 3 pounds  low salt, low cholesterol diabetic diet   Call cardiologist for follow up appointment in 1 week  Dialysis for fluid removal dialysis Monday, Wednesday and Friday Follow up with Dr Kohler and resume usual dialysis schedule at your dialysis center HA1C<7 Continue to follow up with your primary care physician call in am to schedule a follow up appointment /80 Continue meds as opredered above no blood loss Nephrologist to determine Procrit with dialysis, monitor labs CBC with nephrologist Dr Diallo no chest pain Imdur added to current rregime

## 2017-04-06 NOTE — DISCHARGE NOTE ADULT - PATIENT PORTAL LINK FT
“You can access the [facility] Patient Portal, offered by [facility], by registering with the following website: [email or web address]”

## 2017-04-06 NOTE — DISCHARGE NOTE ADULT - PROVIDER TOKENS
TOKEN:'[id number]',FREE:[LAST:[Dr Topete],FIRST:[Prem],PHONE:[(   )    -],FAX:[(   )    -],ADDRESS:[Primary care physician]]

## 2017-04-06 NOTE — DISCHARGE NOTE ADULT - CARE PLAN
Principal Discharge DX:	Gastritis  Goal:	no pain  Instructions for follow-up, activity and diet:	Protonix as ordered  Call primary care physician and gastroenterologists for follow up appointment in 1 week  return to emergency if pain worsens, dizziness, chest pain, intractable vomiting, difficulty breathing, blood  Secondary Diagnosis:	CHF (congestive heart failure), NYHA class I, acute, systolic  Goal:	no exacerbation  Instructions for follow-up, activity and diet:	weight to be done daily call primary care physician and cardiologist if weight gain more then 3 pounds  low salt, low cholesterol diabetic diet   Call cardiologist for follow up appointment in 1 week  Dialysis for fluid removal  Secondary Diagnosis:	ESRD (end stage renal disease) on dialysis  Goal:	dialysis Monday, Wednesday and Friday  Instructions for follow-up, activity and diet:	Follow up with Dr Kohler and resume usual dialysis schedule at your dialysis center  Secondary Diagnosis:	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, with long-term current use of insulin  Goal:	HA1C<7  Instructions for follow-up, activity and diet:	Continue to follow up with your primary care physician call in am to schedule a follow up appointment  Secondary Diagnosis:	Essential hypertension  Goal:	/80  Instructions for follow-up, activity and diet:	Continue meds as opredered above  Secondary Diagnosis:	Anemia in chronic kidney disease  Goal:	no blood loss  Instructions for follow-up, activity and diet:	Nephrologist to determine Procrit with dialysis, monitor labs CBC with nephrologist Dr Diallo  Secondary Diagnosis:	CAD (coronary artery disease)  Goal:	no chest pain  Instructions for follow-up, activity and diet:	Imdur added to current rregime

## 2017-04-06 NOTE — DISCHARGE NOTE ADULT - CARE PROVIDERS DIRECT ADDRESSES
[email or web address],DirectAddress_Unknown,DirectAddress_Unknown

## 2017-04-06 NOTE — DISCHARGE NOTE ADULT - MEDICATION SUMMARY - MEDICATIONS TO TAKE
I will START or STAY ON the medications listed below when I get home from the hospital:    aspirin 81 mg oral tablet  -- 1 tab(s) by mouth once a day  -- Indication: For coronary artery disease    losartan 25 mg oral tablet  -- 1 tab(s) by mouth once a day  -- Indication: For Hypertension    isosorbide dinitrate 30 mg oral tablet  -- 1 tab(s) by mouth 2 times a day  -- Do not drink alcoholic beverages when taking this medication.  It is very important that you take or use this exactly as directed.  Do not skip doses or discontinue unless directed by your doctor.    -- Indication: For coronary artery disease    gabapentin 100 mg oral capsule  -- 2 cap(s) by mouth 3 times a day  -- Indication: For neuropathy    Lantus Solostar Pen 100 units/mL subcutaneous solution  -- 10 unit(s) subcutaneous once a day  -- verified with [facility] [phone]  -- Indication: For type 2 diabetes    simvastatin 40 mg oral tablet  -- 1 tab(s) by mouth once a day (at bedtime)  -- verified with [facility] [phone]  -- Indication: For Hyperlipidemia    Plavix 75 mg oral tablet  -- 1 tab(s) by mouth once a day  -- verified with [facility] [phone]  -- Indication: For coronary artery disease    carvedilol 6.25 mg oral tablet  -- 1 tab(s) by mouth every 12 hours  -- Indication: For Hypertension    pantoprazole 40 mg oral delayed release tablet  -- 1 tab(s) by mouth once a day (before a meal)  -- Indication: For gastritis    Venus-Olman oral tablet  -- 1 tab(s) by mouth once a day  -- verified with [facility] [phone]  -- Indication: For Supplement

## 2017-04-06 NOTE — DISCHARGE NOTE ADULT - CARE PROVIDER_API CALL
Bonilla Diallo), Gastroenterology  [address] [place] [address]  Phone: [phone]  Fax: [phone]    Prem Prado  Primary care physician  Phone: (   )    -  Fax: (   )    -

## 2017-04-06 NOTE — DISCHARGE NOTE ADULT - SECONDARY DIAGNOSIS.
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

## 2017-05-19 NOTE — PHYSICAL THERAPY INITIAL EVALUATION ADULT - PATIENT PROFILE REVIEW, REHAB EVAL
yes/PT Consult Oriented - self; Oriented - place; Oriented - time PT Consult; Ambulate with Assistance/yes

## 2017-07-05 NOTE — DISCHARGE NOTE ADULT - DO YOU HAVE DIFFICULTY CLIMBING STAIRS
Changed drsg. over where healing [place] drain site is to the right lower abd. Gauze and tagaderm placed over site.     Yes

## 2018-01-03 ENCOUNTER — APPOINTMENT (OUTPATIENT)
Dept: INFECTIOUS DISEASE | Facility: CLINIC | Age: 73
End: 2018-01-03

## 2018-02-21 ENCOUNTER — OUTPATIENT (OUTPATIENT)
Dept: OUTPATIENT SERVICES | Facility: HOSPITAL | Age: 73
LOS: 1 days | Discharge: HOME | End: 2018-02-21
Payer: MEDICARE

## 2018-02-21 DIAGNOSIS — Z90.710 ACQUIRED ABSENCE OF BOTH CERVIX AND UTERUS: Chronic | ICD-10-CM

## 2018-02-21 DIAGNOSIS — S91.309A UNSPECIFIED OPEN WOUND, UNSPECIFIED FOOT, INITIAL ENCOUNTER: ICD-10-CM

## 2018-02-21 PROCEDURE — 99204 OFFICE O/P NEW MOD 45 MIN: CPT

## 2018-02-21 PROCEDURE — 97602 WOUND(S) CARE NON-SELECTIVE: CPT

## 2018-02-21 PROCEDURE — G0463: CPT | Mod: 25

## 2018-02-22 DIAGNOSIS — T81.89XD OTHER COMPLICATIONS OF PROCEDURES, NOT ELSEWHERE CLASSIFIED, SUBSEQUENT ENCOUNTER: ICD-10-CM

## 2018-02-22 DIAGNOSIS — I12.9 HYPERTENSIVE CHRONIC KIDNEY DISEASE WITH STAGE 1 THROUGH STAGE 4 CHRONIC KIDNEY DISEASE, OR UNSPECIFIED CHRONIC KIDNEY DISEASE: ICD-10-CM

## 2018-02-22 DIAGNOSIS — L89.611 PRESSURE ULCER OF RIGHT HEEL, STAGE 1: ICD-10-CM

## 2018-02-22 DIAGNOSIS — Z90.49 ACQUIRED ABSENCE OF OTHER SPECIFIED PARTS OF DIGESTIVE TRACT: ICD-10-CM

## 2018-02-22 DIAGNOSIS — Z83.3 FAMILY HISTORY OF DIABETES MELLITUS: ICD-10-CM

## 2018-02-22 DIAGNOSIS — I48.91 UNSPECIFIED ATRIAL FIBRILLATION: ICD-10-CM

## 2018-02-22 DIAGNOSIS — E78.5 HYPERLIPIDEMIA, UNSPECIFIED: ICD-10-CM
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
## 2018-02-22 DIAGNOSIS — L97.512 NON-PRESSURE CHRONIC ULCER OF OTHER PART OF RIGHT FOOT WITH FAT LAYER EXPOSED: ICD-10-CM

## 2018-02-22 DIAGNOSIS — N18.9 CHRONIC KIDNEY DISEASE, UNSPECIFIED: ICD-10-CM

## 2018-03-01 ENCOUNTER — OUTPATIENT (OUTPATIENT)
Dept: OUTPATIENT SERVICES | Facility: HOSPITAL | Age: 73
LOS: 1 days | Discharge: HOME | End: 2018-03-01
Payer: MEDICARE

## 2018-03-01 DIAGNOSIS — T81.89XD OTHER COMPLICATIONS OF PROCEDURES, NOT ELSEWHERE CLASSIFIED, SUBSEQUENT ENCOUNTER: ICD-10-CM

## 2018-03-01 DIAGNOSIS — Z90.710 ACQUIRED ABSENCE OF BOTH CERVIX AND UTERUS: Chronic | ICD-10-CM

## 2018-03-01 PROCEDURE — 97602 WOUND(S) CARE NON-SELECTIVE: CPT

## 2018-03-03 DIAGNOSIS — I48.91 UNSPECIFIED ATRIAL FIBRILLATION: ICD-10-CM

## 2018-03-03 DIAGNOSIS — L89.611 PRESSURE ULCER OF RIGHT HEEL, STAGE 1: ICD-10-CM

## 2018-03-03 DIAGNOSIS — L97.511 NON-PRESSURE CHRONIC ULCER OF OTHER PART OF RIGHT FOOT LIMITED TO BREAKDOWN OF SKIN: ICD-10-CM

## 2018-03-03 DIAGNOSIS — Z83.3 FAMILY HISTORY OF DIABETES MELLITUS: ICD-10-CM

## 2018-03-03 DIAGNOSIS — E78.5 HYPERLIPIDEMIA, UNSPECIFIED: ICD-10-CM
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
## 2018-03-03 DIAGNOSIS — I12.9 HYPERTENSIVE CHRONIC KIDNEY DISEASE WITH STAGE 1 THROUGH STAGE 4 CHRONIC KIDNEY DISEASE, OR UNSPECIFIED CHRONIC KIDNEY DISEASE: ICD-10-CM

## 2018-03-03 DIAGNOSIS — T81.89XD OTHER COMPLICATIONS OF PROCEDURES, NOT ELSEWHERE CLASSIFIED, SUBSEQUENT ENCOUNTER: ICD-10-CM

## 2018-03-03 DIAGNOSIS — Z90.49 ACQUIRED ABSENCE OF OTHER SPECIFIED PARTS OF DIGESTIVE TRACT: ICD-10-CM

## 2018-03-03 DIAGNOSIS — N18.9 CHRONIC KIDNEY DISEASE, UNSPECIFIED: ICD-10-CM

## 2018-03-06 ENCOUNTER — OUTPATIENT (OUTPATIENT)
Dept: OUTPATIENT SERVICES | Facility: HOSPITAL | Age: 73
LOS: 1 days | Discharge: HOME | End: 2018-03-06
Payer: MEDICARE

## 2018-03-06 DIAGNOSIS — T81.89XD OTHER COMPLICATIONS OF PROCEDURES, NOT ELSEWHERE CLASSIFIED, SUBSEQUENT ENCOUNTER: ICD-10-CM

## 2018-03-06 DIAGNOSIS — Z90.710 ACQUIRED ABSENCE OF BOTH CERVIX AND UTERUS: Chronic | ICD-10-CM

## 2018-03-06 PROCEDURE — 97602 WOUND(S) CARE NON-SELECTIVE: CPT

## 2018-03-08 DIAGNOSIS — L97.511 NON-PRESSURE CHRONIC ULCER OF OTHER PART OF RIGHT FOOT LIMITED TO BREAKDOWN OF SKIN: ICD-10-CM

## 2018-03-08 DIAGNOSIS — Z83.3 FAMILY HISTORY OF DIABETES MELLITUS: ICD-10-CM

## 2018-03-08 DIAGNOSIS — Z90.49 ACQUIRED ABSENCE OF OTHER SPECIFIED PARTS OF DIGESTIVE TRACT: ICD-10-CM
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
## 2018-03-08 DIAGNOSIS — T81.89XD OTHER COMPLICATIONS OF PROCEDURES, NOT ELSEWHERE CLASSIFIED, SUBSEQUENT ENCOUNTER: ICD-10-CM

## 2018-03-08 DIAGNOSIS — E78.5 HYPERLIPIDEMIA, UNSPECIFIED: ICD-10-CM

## 2018-03-08 DIAGNOSIS — N18.9 CHRONIC KIDNEY DISEASE, UNSPECIFIED: ICD-10-CM

## 2018-03-08 DIAGNOSIS — I48.91 UNSPECIFIED ATRIAL FIBRILLATION: ICD-10-CM

## 2018-03-08 DIAGNOSIS — I12.9 HYPERTENSIVE CHRONIC KIDNEY DISEASE WITH STAGE 1 THROUGH STAGE 4 CHRONIC KIDNEY DISEASE, OR UNSPECIFIED CHRONIC KIDNEY DISEASE: ICD-10-CM

## 2018-03-08 DIAGNOSIS — L89.611 PRESSURE ULCER OF RIGHT HEEL, STAGE 1: ICD-10-CM

## 2018-03-13 ENCOUNTER — RESULT REVIEW (OUTPATIENT)
Age: 73
End: 2018-03-13

## 2018-03-13 ENCOUNTER — OUTPATIENT (OUTPATIENT)
Dept: OUTPATIENT SERVICES | Facility: HOSPITAL | Age: 73
LOS: 1 days | Discharge: HOME | End: 2018-03-13
Payer: MEDICARE

## 2018-03-13 DIAGNOSIS — T81.89XD OTHER COMPLICATIONS OF PROCEDURES, NOT ELSEWHERE CLASSIFIED, SUBSEQUENT ENCOUNTER: ICD-10-CM

## 2018-03-13 DIAGNOSIS — Z90.710 ACQUIRED ABSENCE OF BOTH CERVIX AND UTERUS: Chronic | ICD-10-CM

## 2018-03-13 PROCEDURE — 87186 SC STD MICRODIL/AGAR DIL: CPT

## 2018-03-13 PROCEDURE — 11042 DBRDMT SUBQ TIS 1ST 20SQCM/<: CPT

## 2018-03-13 PROCEDURE — 87070 CULTURE OTHR SPECIMN AEROBIC: CPT

## 2018-03-13 PROCEDURE — 88304 TISSUE EXAM BY PATHOLOGIST: CPT

## 2018-03-13 PROCEDURE — 88304 TISSUE EXAM BY PATHOLOGIST: CPT | Mod: 26

## 2018-03-13 PROCEDURE — 87075 CULTR BACTERIA EXCEPT BLOOD: CPT

## 2018-03-14 LAB
GRAM STN FLD: SIGNIFICANT CHANGE UP
SPECIMEN SOURCE: SIGNIFICANT CHANGE UP

## 2018-03-15 LAB — SURGICAL PATHOLOGY FINAL REPORT - CH: SIGNIFICANT CHANGE UP

## 2018-03-16 DIAGNOSIS — E78.5 HYPERLIPIDEMIA, UNSPECIFIED: ICD-10-CM

## 2018-03-16 DIAGNOSIS — E66.3 OVERWEIGHT: ICD-10-CM

## 2018-03-16 DIAGNOSIS — Z90.49 ACQUIRED ABSENCE OF OTHER SPECIFIED PARTS OF DIGESTIVE TRACT: ICD-10-CM
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
## 2018-03-16 DIAGNOSIS — Z83.3 FAMILY HISTORY OF DIABETES MELLITUS: ICD-10-CM

## 2018-03-16 DIAGNOSIS — I48.91 UNSPECIFIED ATRIAL FIBRILLATION: ICD-10-CM

## 2018-03-16 DIAGNOSIS — I12.9 HYPERTENSIVE CHRONIC KIDNEY DISEASE WITH STAGE 1 THROUGH STAGE 4 CHRONIC KIDNEY DISEASE, OR UNSPECIFIED CHRONIC KIDNEY DISEASE: ICD-10-CM

## 2018-03-16 DIAGNOSIS — N18.9 CHRONIC KIDNEY DISEASE, UNSPECIFIED: ICD-10-CM

## 2018-03-16 DIAGNOSIS — L97.511 NON-PRESSURE CHRONIC ULCER OF OTHER PART OF RIGHT FOOT LIMITED TO BREAKDOWN OF SKIN: ICD-10-CM

## 2018-03-16 DIAGNOSIS — A49.9 BACTERIAL INFECTION, UNSPECIFIED: ICD-10-CM

## 2018-03-16 DIAGNOSIS — E11.621 TYPE 2 DIABETES MELLITUS WITH FOOT ULCER: ICD-10-CM

## 2018-03-16 LAB
-  AMPICILLIN/SULBACTAM: SIGNIFICANT CHANGE UP
-  CEFAZOLIN: SIGNIFICANT CHANGE UP
-  CIPROFLOXACIN: SIGNIFICANT CHANGE UP
-  CLINDAMYCIN: SIGNIFICANT CHANGE UP
-  ERYTHROMYCIN: SIGNIFICANT CHANGE UP
-  GENTAMICIN: SIGNIFICANT CHANGE UP
-  LEVOFLOXACIN: SIGNIFICANT CHANGE UP
-  MOXIFLOXACIN(AEROBIC): SIGNIFICANT CHANGE UP
-  OXACILLIN: SIGNIFICANT CHANGE UP
-  PENICILLIN: SIGNIFICANT CHANGE UP
-  RIFAMPIN: SIGNIFICANT CHANGE UP
-  TETRACYCLINE: SIGNIFICANT CHANGE UP
-  TRIMETHOPRIM/SULFAMETHOXAZOLE: SIGNIFICANT CHANGE UP
-  VANCOMYCIN: SIGNIFICANT CHANGE UP
METHOD TYPE: SIGNIFICANT CHANGE UP

## 2018-03-20 ENCOUNTER — OUTPATIENT (OUTPATIENT)
Dept: OUTPATIENT SERVICES | Facility: HOSPITAL | Age: 73
LOS: 1 days | Discharge: HOME | End: 2018-03-20
Payer: MEDICARE

## 2018-03-20 DIAGNOSIS — T81.89XD OTHER COMPLICATIONS OF PROCEDURES, NOT ELSEWHERE CLASSIFIED, SUBSEQUENT ENCOUNTER: ICD-10-CM

## 2018-03-20 DIAGNOSIS — Z90.710 ACQUIRED ABSENCE OF BOTH CERVIX AND UTERUS: Chronic | ICD-10-CM

## 2018-03-20 LAB
CULTURE RESULTS: SIGNIFICANT CHANGE UP
ORGANISM # SPEC MICROSCOPIC CNT: SIGNIFICANT CHANGE UP
ORGANISM # SPEC MICROSCOPIC CNT: SIGNIFICANT CHANGE UP
SPECIMEN SOURCE: SIGNIFICANT CHANGE UP

## 2018-03-20 PROCEDURE — 97602 WOUND(S) CARE NON-SELECTIVE: CPT

## 2018-03-23 DIAGNOSIS — E78.5 HYPERLIPIDEMIA, UNSPECIFIED: ICD-10-CM

## 2018-03-23 DIAGNOSIS — L89.893 PRESSURE ULCER OF OTHER SITE, STAGE 3: ICD-10-CM

## 2018-03-23 DIAGNOSIS — I48.91 UNSPECIFIED ATRIAL FIBRILLATION: ICD-10-CM

## 2018-03-23 DIAGNOSIS — E66.3 OVERWEIGHT: ICD-10-CM

## 2018-03-23 DIAGNOSIS — Z90.49 ACQUIRED ABSENCE OF OTHER SPECIFIED PARTS OF DIGESTIVE TRACT: ICD-10-CM

## 2018-03-23 DIAGNOSIS — N18.9 CHRONIC KIDNEY DISEASE, UNSPECIFIED: ICD-10-CM

## 2018-03-23 DIAGNOSIS — L97.511 NON-PRESSURE CHRONIC ULCER OF OTHER PART OF RIGHT FOOT LIMITED TO BREAKDOWN OF SKIN: ICD-10-CM

## 2018-03-23 DIAGNOSIS — Z83.3 FAMILY HISTORY OF DIABETES MELLITUS: ICD-10-CM

## 2018-03-23 DIAGNOSIS — I12.9 HYPERTENSIVE CHRONIC KIDNEY DISEASE WITH STAGE 1 THROUGH STAGE 4 CHRONIC KIDNEY DISEASE, OR UNSPECIFIED CHRONIC KIDNEY DISEASE: ICD-10-CM

## 2018-03-23 DIAGNOSIS — E11.621 TYPE 2 DIABETES MELLITUS WITH FOOT ULCER: ICD-10-CM

## 2018-10-24 NOTE — ED PROVIDER NOTE - CHPI ED SYMPTOMS NEG
no fever/no edema/no chest pain/no chills
I will STOP taking the medications listed below when I get home from the hospital:  None

## 2020-09-08 NOTE — H&P ADULT. - CONSTITUTIONAL
Prior auth paper form & Prior auth on CoverMyMeds asked the exact same questions therefore the paperwork for prior auth was not sent in. Both PA's asked if Pt had a diagnosis or Greene's esophagus or Hypersecretory syndrome such as Zollinger-Carreno syndrome which Pt does not have either DX. Pt was not approved for Pantoprazole and will need to pay for this out of pocket. detailed exam

## 2021-07-16 NOTE — DISCHARGE NOTE ADULT - ABILITY TO HEAR (WITH HEARING AID OR HEARING APPLIANCE IF NORMALLY USED):
[facility] Dermatology Note  Encounter Date: Jul 16, 2021  Office Visit     Dermatology Problem List:  # Family history melanoma (father).  #. NUB, right posterior thigh, symptomatic nevus   -s/p biopsy 7/16/21    ____________________________________________    Assessment & Plan:  # NUB, right posterior thigh, ddx symptomatic nevus   - s/p biopsy. See procedure note.    # Multiple benign nevi.   # Family history melanoma (father).  - No concerning lesions today --> Will monitor moles on right inner thigh and left upper shin  - Counseled on ABCDEs of melanoma and sun protection - recommend SPF 30 or higher with frequent application   - Return sooner if noticing changing or symptomatic lesions    # Benign lesions: seborrheic keratoses, cherry angiomas.  Discussed the natural history and benign nature of this lesion. Reassurance provided that no additional treatment is necessary.     Procedures Performed:   - Shave biopsy procedure note, location(s): right posterior thigh. After discussion of benefits and risks including but not limited to bleeding, infection, scar, incomplete removal, recurrence, and non-diagnostic biopsy, written consent and photographs were obtained. The area was cleaned with isopropyl alcohol. 0.5mL of 1% lidocaine with epinephrine was injected to obtain adequate anesthesia of lesion(s). Shave biopsy at site(s) performed. Hemostasis was achieved with aluminium chloride. Petrolatum ointment and a sterile dressing were applied. The patient tolerated the procedure and no complications were noted. The patient was provided with verbal and written post care instructions.     Follow-up: 1 year(s) in-person, or earlier for new or changing lesions    Staff and Scribe:     Provider Disclosure:   The documentation recorded by the scribe accurately reflects the services I personally performed and the decisions made by me.    Luz Figueroa MD    Department of  Adequate: hears normal conversation without difficulty Dermatology  [facility]: Phone: [phone], Fax: [phone]  7/20/2021       Scribe Disclosure:  ILisa, am serving as a scribe to document services personally performed by Luz Figueroa MD at this visit, based upon the provider's statements to me. All documentation has been reviewed by the aforementioned provider prior to being entered into the official medical record.     ILisa, a scribe, prepared the chart for today's encounter.   ____________________________________________    CC: Skin Check (gerardo is coming in today for a skin check, states that she has one spot of concern on her back)      HPI:  Ms. Gerardo Mandujano is a(n) 48 year old female who presents today as a new patient for [facility]. Today, the patient denies any personal history of skin cancer but endorses a family history of melanoma. Recently, she noticed a mole to her back that has some associated itchiness but no associated pain. She also recently noticed a newer raised mole to her chest. She has not noticed any recent changes to this mole. The patient does wear sunscreen. The patient otherwise denies any spots that are itching, changing, bleeding, or growing.     Patient is otherwise feeling well, without additional skin concerns.    Labs Reviewed:  N/A    Physical Exam:  Vitals: LMP 11/05/2012   SKIN: Total skin excluding the undergarment areas was performed. The exam included the head/face, neck, both arms, chest, back, abdomen, both legs, digits and/or nails.   - Right posterior thigh fleshy pedunculated papule   - Right inner thigh approximately 4 mm even light brown macule superiorly with slight pedicle, dermoscopy very evenly reticulated   - There are dome shaped bright red papules on the back and extremities.   - Multiple regular brown pigmented macules and papules are identified on the trunk and extremities.   - Left upper shin somewhat of  a triangular shaped light-medium brown macule, dermoscopy faintly reticulated pigment   - There are waxy stuck on tan to brown papules on the trunk, extremities, and scalp.   - No other lesions of concern on areas examined.     Medications:  Current Outpatient Medications   Medication     FERROUS GLUCONATE     gabapentin (NEURONTIN) 100 MG capsule     ibuprofen 200 MG capsule     lisinopril (ZESTRIL) 10 MG tablet     VITAMIN D, CHOLECALCIFEROL, PO     fluticasone (FLONASE) 50 MCG/ACT spray     No current facility-administered medications for this visit.        Past Medical History:   Patient Active Problem List   Diagnosis     CARDIOVASCULAR SCREENING; LDL GOAL LESS THAN 160     Tension headache     Headache     Neck pain     Spasm of muscle     Arthralgia of temporomandibular joint     Benign essential hypertension     Past Medical History:   Diagnosis Date     Allergies     Seasonal     Tension headache         CC Referred Self, MD  No address on file on close of this encounter.

## 2025-04-15 NOTE — ED ADULT TRIAGE NOTE - ESI TRIAGE ACUITY LEVEL, MLM
3
Eat healthy foods you enjoy. Apixaban/Eliquis DOES NOT have a special diet. Limit your alcohol intake.